# Patient Record
Sex: FEMALE | Race: WHITE
[De-identification: names, ages, dates, MRNs, and addresses within clinical notes are randomized per-mention and may not be internally consistent; named-entity substitution may affect disease eponyms.]

---

## 2017-03-12 ENCOUNTER — HOSPITAL ENCOUNTER (OUTPATIENT)
Age: 67
End: 2017-03-12
Payer: MEDICARE

## 2017-03-12 ENCOUNTER — HOSPITAL ENCOUNTER (EMERGENCY)
Dept: HOSPITAL 76 - ED | Age: 67
Discharge: ANOTHER HOSPITAL | End: 2017-03-12
Payer: MEDICARE

## 2017-03-12 ENCOUNTER — HOSPITAL ENCOUNTER (INPATIENT)
Dept: HOSPITAL 21 - PCC | Age: 67
LOS: 2 days | Discharge: HOME | DRG: 310 | End: 2017-03-14
Attending: FAMILY MEDICINE | Admitting: FAMILY MEDICINE
Payer: MEDICARE

## 2017-03-12 ENCOUNTER — HOSPITAL ENCOUNTER (OUTPATIENT)
Age: 67
Discharge: ANOTHER HOSPITAL | End: 2017-03-12
Payer: MEDICARE

## 2017-03-12 VITALS — HEART RATE: 69 BPM

## 2017-03-12 VITALS — BODY MASS INDEX: 29.94 KG/M2 | HEIGHT: 66 IN | WEIGHT: 186.29 LBS

## 2017-03-12 VITALS
SYSTOLIC BLOOD PRESSURE: 143 MMHG | HEART RATE: 71 BPM | DIASTOLIC BLOOD PRESSURE: 80 MMHG | OXYGEN SATURATION: 99 % | RESPIRATION RATE: 16 BRPM

## 2017-03-12 VITALS
OXYGEN SATURATION: 99 % | HEART RATE: 64 BPM | DIASTOLIC BLOOD PRESSURE: 67 MMHG | SYSTOLIC BLOOD PRESSURE: 122 MMHG | RESPIRATION RATE: 15 BRPM

## 2017-03-12 VITALS — SYSTOLIC BLOOD PRESSURE: 123 MMHG | DIASTOLIC BLOOD PRESSURE: 69 MMHG

## 2017-03-12 DIAGNOSIS — I47.1: Primary | ICD-10-CM

## 2017-03-12 DIAGNOSIS — R91.8: ICD-10-CM

## 2017-03-12 DIAGNOSIS — I47.2: Primary | ICD-10-CM

## 2017-03-12 DIAGNOSIS — I49.8: Primary | ICD-10-CM

## 2017-03-12 DIAGNOSIS — Z79.82: ICD-10-CM

## 2017-03-12 DIAGNOSIS — I47.2: ICD-10-CM

## 2017-03-12 DIAGNOSIS — R00.2: ICD-10-CM

## 2017-03-12 DIAGNOSIS — E78.5: ICD-10-CM

## 2017-03-12 DIAGNOSIS — I49.1: ICD-10-CM

## 2017-03-12 DIAGNOSIS — R00.2: Primary | ICD-10-CM

## 2017-03-12 DIAGNOSIS — I10: ICD-10-CM

## 2017-03-12 LAB
ALBUMIN/GLOBULIN RATIO: 1.6 (ref 1–2.2)
ANION GAP: 5 (ref 6–13)
APPEARANCE UR: CLEAR
BASOPHILS # (AUTO): 0 10^3/UL (ref 0–0.1)
BILIRUBIN,TOTAL: 1.6 MG/DL (ref 0.2–1)
BILIRUBIN,URINE: NEGATIVE
BUN - BLOOD UREA NITROGEN: 21 MG/DL (ref 6–20)
CALCIUM: 10 MG/DL (ref 8.5–10.3)
CARBON DIOXIDE - CO2: 27 MMOL/L (ref 21–32)
CHLORIDE: 106 MMOL/L (ref 101–111)
COLOR,URINE: (no result)
CREATININE: 0.8 MG/DL (ref 0.4–1)
EOSINOPHILS # (AUTO): 0.1 10^3/UL (ref 0–0.7)
GFR - MDRD: 72 (ref 89–?)
GLOBULIN: 2.6 G/DL (ref 2.1–4.2)
GLUCOSE: 105 MG/DL (ref 70–100)
HBV SURFACE AG SERPL QL CFM: 0.7 %
HBV SURFACE AG SERPL QL CFM: 1.4 %
HCT - HEMATOCRIT: 43 % (ref 37–47)
HCV AB SER QL: 8.8 FL (ref 7.9–10.8)
HGB - HEMOGLOBIN: 14.6 G/DL (ref 12–16)
LIPASE: 29 U/L (ref 22–51)
LYMPHOCYTES # (AUTO): 2 10^3/UL (ref 1.5–3.5)
LYMPHOCYTES % (AUTO): 33.2 %
MAGNESIUM: 2.5 MG/DL (ref 1.6–2.6)
MCH RBC QN AUTO: 29.7 PG (ref 27–35)
MEAN CORPUSCULAR HEMOGLOBIN: 30.1 PG (ref 27–31)
MEAN CORPUSCULAR HGB CONC: 34 G/DL (ref 32–36)
MEAN CORPUSCULAR VOLUME: 87.8 FL (ref 81–100)
MEAN CORPUSCULAR VOLUME: 88.6 FL (ref 81–99)
MONOCYTES # (AUTO): 0.4 10^3/UL (ref 0–1)
MONOCYTES % (AUTO): 6.7 %
NEUTROPHILS # (AUTO): 3.6 10^3/UL (ref 1.5–6.6)
NEUTROPHILS % (AUTO): 58 %
NUCLEATED RED BLOOD CELLS AUTO: 0 /100WBC
PELGER HUET CELLS BLD QL SMEAR: (no result)
PH,URINE: 6 (ref 5–8)
PH,URINE: 6 PH (ref 5–7.5)
PLATELET COUNT: 260 BIL/L (ref 150–400)
POTASSIUM: 3.8 MMOL/L (ref 3.5–5)
RBC UR QL: (no result)
RED BLOOD COUNT: 4.85 10^6/UL (ref 4.2–5.4)
RED CELL DISTRIBUTION WIDTH: 13.5 % (ref 12–15)
SODIUM: 138 MMOL/L (ref 135–145)
SPECIFIC GRAVITY,URINE: 1.01 (ref 1–1.03)
SPECIFIC GRAVITY,URINE: <=1.005 (ref 1–1.03)
TOTAL PROTEIN: 6.8 G/DL (ref 6.7–8.2)
TROPONIN T SERPL-MCNC: 0.01 UG/L (ref 0–0.01)
UA CHARGE (STRIP ONLY): YES
UA W/ MICROSCOPIC CHARGE: (no result)
UNCORRECTED WHITE BLOOD COUNT: 6.1 X10^3/UL
UR CULTURE IF IND: (no result)
UROBILINOGEN UR-MCNC: NORMAL MG/DL
WHITE BLOOD COUNT: 6.1 X10^3/UL (ref 4.8–10.8)
YEAST,URINE: (no result)

## 2017-03-12 PROCEDURE — 80053 COMPREHEN METABOLIC PANEL: CPT

## 2017-03-12 PROCEDURE — 81003 URINALYSIS AUTO W/O SCOPE: CPT

## 2017-03-12 PROCEDURE — 84443 ASSAY THYROID STIM HORMONE: CPT

## 2017-03-12 PROCEDURE — 93010 ELECTROCARDIOGRAM REPORT: CPT

## 2017-03-12 PROCEDURE — 87086 URINE CULTURE/COLONY COUNT: CPT

## 2017-03-12 PROCEDURE — 96374 THER/PROPH/DIAG INJ IV PUSH: CPT

## 2017-03-12 PROCEDURE — 99284 EMERGENCY DEPT VISIT MOD MDM: CPT

## 2017-03-12 PROCEDURE — 85025 COMPLETE CBC W/AUTO DIFF WBC: CPT

## 2017-03-12 PROCEDURE — 83690 ASSAY OF LIPASE: CPT

## 2017-03-12 PROCEDURE — 71020: CPT

## 2017-03-12 PROCEDURE — 81001 URINALYSIS AUTO W/SCOPE: CPT

## 2017-03-12 PROCEDURE — 84484 ASSAY OF TROPONIN QUANT: CPT

## 2017-03-12 PROCEDURE — 93005 ELECTROCARDIOGRAM TRACING: CPT

## 2017-03-12 PROCEDURE — 36415 COLL VENOUS BLD VENIPUNCTURE: CPT

## 2017-03-12 NOTE — XRAY PRELIMINARY REPORT
Accession: [id number]

Exam: XR Chest 2 View PA/LAT

 

IMPRESSION: No evidence of active cardiopulmonary process.

 

[facility]

 

SITE ID: [id number]

## 2017-03-12 NOTE — XRAY REPORT
EXAM:

CHEST RADIOGRAPHY

 

EXAM DATE: 3/12/2017 02:21 PM.

 

CLINICAL HISTORY: Palpitations.

 

COMPARISON: 12/13/2015.

 

TECHNIQUE: 2 views.

 

FINDINGS: 

Lungs/Pleura: No focal opacities evident. No pleural effusion. No pneumothorax. Normal volumes.

 

Mediastinum: Heart and mediastinal contours are unremarkable.

 

Other: None.

 

IMPRESSION: No evidence of active cardiopulmonary process.

 

[facility]

Referring Provider Line: [phone]

 

SITE ID: [id number]

## 2017-03-12 NOTE — ED PHYSICIAN DOCUMENTATION
History of Present Illness





- Stated complaint


Stated Complaint: RAPID HEART RATE





- Chief complaint


Chief Complaint: Cardiac





- History obtained from


History obtained from: Patient





- History of Present Illness


Timing: Last night





- Additonal information


Additional information: 





65 y/o female with heart palpitations since last night has called the ambulance 

and was evaluated in her home. She reports brief episodes of "irregular heart 

rate" and "rapid heart rate" She was not able to clock a rate. When medics 

arrived their initial rhythm strip showed a dysorganized rhythm with what looks 

like PAC's. The medic indicated that they did see about a 6 beat run of V-tach 

but were unable to capture this on the strip. They were able to capture some 

couplets. The patient 





Review of Systems


Constitutional: denies: Fever, Chills, Myalgias


Eyes: denies: Decreased vision


Ears: denies: Ear pain


Nose: denies: Rhinorrhea / runny nose, Congestion


Throat: denies: Sore throat


Cardiac: reports: Palpitations.  denies: Chest pain / pressure, Pedal edema, 

Calf pain


Respiratory: denies: Dyspnea, Cough


GI: denies: Abdominal Pain, Nausea, Vomiting


: denies: Dysuria, Frequency





PD PAST MEDICAL HISTORY





- Past Medical History


Past Medical History: Yes


Cardiovascular: Hypertension, High cholesterol


Respiratory: Asthma, Pneumonia


HEENT: Glaucoma





- Past Surgical History


Past Surgical History: Yes


/GYN: Hysterectomy


HEENT: Tonsil/Adenoidectomy





- Present Medications


Home Medications: 


 Ambulatory Orders











 Medication  Instructions  Recorded  Confirmed


 


Amlodipine/Valsartan [Exforge 1 each PO DAILY 10/29/15 03/12/17





5-160 mg Tablet]   


 


Estradiol [Estrace] 0.5 mg PO DAILY 10/29/15 03/12/17


 


Aspirin 1 tab DAILY PRN 12/13/15 03/12/17


 


diazePAM [Valium] 5 mg PO TID PRN #15 tablet 12/13/15 03/12/17














- Allergies


Allergies/Adverse Reactions: 


 Allergies











Allergy/AdvReac Type Severity Reaction Status Date / Time


 


No Known Drug Allergies Allergy   Verified 10/29/15 21:02














- Social History


Does the pt smoke?: No


Smoking Status: Never smoker


Does the pt drink ETOH?: Yes


Does the pt have substance abuse?: No





- Immunizations


Immunizations are current?: No


Immunizations: TDAP >10years/unknown





PD ED PE NORMAL





- Vitals


Vital signs reviewed: Yes (tachy and hypertensive )





- General


General: Alert and oriented X 3, No acute distress, Well developed/nourished





- HEENT


HEENT: Atraumatic, PERRL, EOMI





- Neck


Neck: Supple, no meningeal sign, No bony TTP





- Cardiac


Cardiac: RRR, No murmur





- Respiratory


Respiratory: No respiratory distress, Clear bilaterally





- Abdomen


Abdomen: Soft, Non tender





- Back


Back: No CVA TTP, No spinal TTP





- Derm


Derm: Normal color, No rash





Results





- Vitals


Vitals: 


 Vital Signs - 24 hr











  03/12/17 03/12/17 03/12/17





  13:46 16:04 17:45


 


Temperature 36.4 C L  36.6 C


 


Heart Rate 107 H 101 H 82


 


Respiratory 18  16





Rate   


 


Blood Pressure 139/89 H 127/96 H 123/69


 


O2 Saturation 99 99 99








 Oxygen











O2 Source                      Room air

















- EKG (time done)


  ** 1341


Rate: Rate (enter#) (73)


Rhythm: NSR


Ischemia: ST depression (inferior )


Other comments: Other comments (atrial premature complex)


Compare to prior EKG: Changed from prior EKG (SPT 12-13-15 isolated t-wave 

inversion in III has occured and PAC's have occured)


Computer interpretation: Agree with computer





- Tele (time rhythm occurred)


  ** 1407


Telemetry / rhythm strip: V tach





- Labs


Labs: 


 Laboratory Tests











  03/12/17 03/12/17 03/12/17





  13:40 13:45 13:45


 


WBC   6.1 


 


RBC   4.85 


 


Hgb   14.6 


 


Hct   43.0 


 


MCV   88.6 


 


MCH   30.1 


 


MCHC   34.0 


 


RDW   13.5 


 


Plt Count   257 


 


MPV   8.8 


 


Neut #   3.6 


 


Lymph #   2.0 


 


Mono #   0.4 


 


Eos #   0.1 


 


Baso #   0.0 


 


Absolute Nucleated RBC   0.00 


 


Nucleated RBCs   0.0 


 


Sodium    138


 


Potassium    3.8


 


Chloride    106


 


Carbon Dioxide    27


 


Anion Gap    5.0 L


 


BUN    21 H


 


Creatinine    0.8


 


Estimated GFR (MDRD)    72 L


 


Glucose    105 H


 


Calcium    10.0


 


Total Bilirubin    1.6 H


 


AST    17


 


ALT    21


 


Alkaline Phosphatase    63


 


Troponin I   


 


Total Protein    6.8


 


Albumin    4.2


 


Globulin    2.6


 


Albumin/Globulin Ratio    1.6


 


Lipase    29


 


TSH  1.22  


 


Urine Color   


 


Urine Clarity   


 


Urine pH   


 


Ur Specific Gravity   


 


Urine Protein   


 


Urine Glucose (UA)   


 


Urine Ketones   


 


Urine Occult Blood   


 


Urine Nitrite   


 


Urine Bilirubin   


 


Urine Urobilinogen   


 


Ur Leukocyte Esterase   


 


Ur Microscopic Review   


 


Urine Culture Comments   














  03/12/17 03/12/17





  13:45 15:34


 


WBC  


 


RBC  


 


Hgb  


 


Hct  


 


MCV  


 


MCH  


 


MCHC  


 


RDW  


 


Plt Count  


 


MPV  


 


Neut #  


 


Lymph #  


 


Mono #  


 


Eos #  


 


Baso #  


 


Absolute Nucleated RBC  


 


Nucleated RBCs  


 


Sodium  


 


Potassium  


 


Chloride  


 


Carbon Dioxide  


 


Anion Gap  


 


BUN  


 


Creatinine  


 


Estimated GFR (MDRD)  


 


Glucose  


 


Calcium  


 


Total Bilirubin  


 


AST  


 


ALT  


 


Alkaline Phosphatase  


 


Troponin I  < 0.04 


 


Total Protein  


 


Albumin  


 


Globulin  


 


Albumin/Globulin Ratio  


 


Lipase  


 


TSH  


 


Urine Color   YELLOW


 


Urine Clarity   CLEAR


 


Urine pH   6.0


 


Ur Specific Gravity   <=1.005


 


Urine Protein   NEGATIVE


 


Urine Glucose (UA)   NEGATIVE


 


Urine Ketones   NEGATIVE


 


Urine Occult Blood   NEGATIVE


 


Urine Nitrite   NEGATIVE


 


Urine Bilirubin   NEGATIVE


 


Urine Urobilinogen   0.2 (NORMAL)


 


Ur Leukocyte Esterase   NEGATIVE


 


Ur Microscopic Review   NOT INDICATED


 


Urine Culture Comments   NOT INDICATED














- Rads (name of study)


  ** 2 view chest


Radiology: Prelim report reviewed (Impression: No evidence of active 

cardiopulmonary process.), EMP read indepedently, See rad report





PD MEDICAL DECISION MAKING





- ED course


Complexity details: reviewed old records, reviewed results, re-evaluated patient

, considered differential, d/w patient, d/w family, d/w consultant (Caroline:  

faxed strips for review. After review of strips and EKG he recommends admission 

here, administration of metoprolol 50gm BID, Echo in am and stress testing with 

transfer if +. Dr. Moreland is consulted in the case and recommends transfer. )


ED course: 





65 y/o female with symptomatic arrhythmia is found to have intermittent v-tach. 

She is administered magnesium 2gm IV and she is given metoprolol succinate 50mg 

PO. Arrangements are made for transfer to [facility] with Dr. Benitez accepting . 

After administration of metoprolol her arrhythmia subsided as did her symptoms. 





Departure





- Departure


Disposition: 02 Transfer [facility]


Clinical Impression: 


 Ventricular tachycardia (paroxysmal)


Discharge Date/Time: 03/12/17 17:50

## 2017-03-13 VITALS
RESPIRATION RATE: 23 BRPM | OXYGEN SATURATION: 98 % | SYSTOLIC BLOOD PRESSURE: 132 MMHG | HEART RATE: 75 BPM | DIASTOLIC BLOOD PRESSURE: 74 MMHG

## 2017-03-13 VITALS
HEART RATE: 58 BPM | RESPIRATION RATE: 16 BRPM | DIASTOLIC BLOOD PRESSURE: 75 MMHG | OXYGEN SATURATION: 97 % | SYSTOLIC BLOOD PRESSURE: 114 MMHG

## 2017-03-13 VITALS
RESPIRATION RATE: 18 BRPM | DIASTOLIC BLOOD PRESSURE: 80 MMHG | OXYGEN SATURATION: 95 % | SYSTOLIC BLOOD PRESSURE: 132 MMHG | HEART RATE: 61 BPM

## 2017-03-13 VITALS
RESPIRATION RATE: 16 BRPM | OXYGEN SATURATION: 95 % | DIASTOLIC BLOOD PRESSURE: 68 MMHG | SYSTOLIC BLOOD PRESSURE: 111 MMHG | HEART RATE: 73 BPM

## 2017-03-13 VITALS — HEART RATE: 74 BPM

## 2017-03-13 VITALS
OXYGEN SATURATION: 96 % | RESPIRATION RATE: 16 BRPM | DIASTOLIC BLOOD PRESSURE: 64 MMHG | SYSTOLIC BLOOD PRESSURE: 101 MMHG | HEART RATE: 64 BPM

## 2017-03-13 VITALS
OXYGEN SATURATION: 96 % | DIASTOLIC BLOOD PRESSURE: 80 MMHG | HEART RATE: 66 BPM | SYSTOLIC BLOOD PRESSURE: 125 MMHG | RESPIRATION RATE: 20 BRPM

## 2017-03-13 VITALS — HEART RATE: 58 BPM

## 2017-03-13 RX ADMIN — TIMOLOL MALEATE SCH DROP: 5 SOLUTION/ DROPS OPHTHALMIC at 07:51

## 2017-03-13 NOTE — NUR
CT scan



Patient off floor to CT scan accompanied by nurse and CNA. Tele continued sinus rhythm with 
PVCs and SVPBs, no runs of v-tach. Returned to floor at 0350. Continue to monitor.

## 2017-03-13 NOTE — DRSVH
[facility]

                                                          [address] [place] [address]

                                                         Phone [phone]

 

 

 

                            Echocardiogram Report

Name: AMARI GUZMAN                             Study Date: 2017      

Height: 66 in

Hospital MRN #: [id number]                       Exam Location: [facility]          

Weight: 187 lb

Account #: [id number]                           Gender: Female

                                                                             

BSA: 1.9 m2

: 1950                                  Age: 66 yrs                 

BP: 132/74 mmHg

Reason For Study: V-TACH

Ordering Physician: HOSPITALIST [facility]              Performed By: Sarah Garrido

Referring Physician: DR. CALVIN JONES, DR. HARVEY

 

Interpretation Summary

1. Normal left ventricular size, normal wall thickness and systolic function

with an estimated EF of 60-65%

2. Normal right ventricular size and systolic function.

3. No valvular pathology appreciated

 

There is no old study for comparison

 

Procedure:   A two-dimensional transthoracic echocardiogram with color flow

and Doppler was performed. The study quality was technically adequate. A

contrast injection of Definity was performed to improve assessment of LV

function. A total of 3 cc of contrast was given. There is no prior

echocardiogram noted for this patient. The patient was in normal sinus rhythm

during the exam.

Left Ventricle:   The left ventricle is normal in size. There is normal left

ventricular wall thickness. The ejection fraction is estimated to be 60-65%.

No obvious focal wall motion abnormalities appreciated. Assessment of

diastolic parameters indicates normal left ventricular diastolic function and

normal filling pressures.

Right Ventricle:   The right ventricle is normal in size and function.

Atria:   Both atria are normal in size. There is no Doppler evidence for an

interatrial shunt.

Mitral Valve:   The mitral valve leaflets appear normal. There is no evidence

of stenosis, fluttering, or prolapse. There is trace mitral regurgitation.

Aortic Valve:   The aortic valve is trileaflet. The aortic valve opens well.

No aortic regurgitation is present.

Tricuspid Valve:   The tricuspid valve leaflets are thin and pliable. There

is a trace or physiologic amount of tricuspid regurgitation. The right

ventricular systolic pressure is estimated at 28 mmHg assuming a right atrial

pressure of 3 mm Hg.

Pulmonic Valve:   The pulmonic valve is not well visualized. There is a trace

or physiologic amount of pulmonic regurgitation.

Great Vessels:   The aortic root is normal size. The dimensions of the

ascending aorta are normal. The aortic arch is normal in size. The pulmonary

artery is normal size. The IVC is of normal diameter and collapses greater

than 50% with a sniff. This suggests a low right atrial pressure of 3 mm Hg.

Pericardium/ Pleura   There is no pericardial effusion. There is no pleural

effusion.

 

MMode/2D Measurements & Calculations

LVIDd: 4.5 cm         LA dimension: 3.8 cm  RA long axis  LVOT diam: 1.9 cm

LVIDs: 2.8 cm                                             AoV Opening

FS: 38.6 %            LA A2 area: 16.7 cm   RA area

IVSd: 1.1 cm          LA A4 area: 23.0 cm                 Ao root diam

LVPWd: 0.62 cm        LA length (vol)       : 18.0 cm

                                            RA vol        Aortic Jxn: 2.9 cm

                      LA vol: 60.8 ml       : 53.0 ml     asc Aorta Diam

                      LA vol index          RA VI

                                            : 27.3 mm/    Ao Arch Diam (Prox

                                            RVDd major    Trans): 2.5 cm

                      IVC diam: 2.0 cm      : 5.6 cm

 

       _______________________________________________________________

LV lucero. diameter/BSA LV sys. diameter/BSA  RVD1 (basal)

(cm/m^2): 2.3         (cm/m^2): 1.4

 

Doppler Measurements & Calculations

Ao V2 max        MV E max wayne         MV E/A: 1.1           TR max wayne

: 111.1 cm/sec   : 70.9 cm/sec        Med Peak E' Wayne       : 247.5 cm/sec

Ao max PG        MV A max wayne                               TR max PG

: 4.9 mmHg       : 66.4 cm/sec        E/E' med: 12.3        : 24.5 mmHg

Ao mean PG       MV P1/2t: 56.9 msec  Lat Peak E' Wayne       PA V2 max

                                                            : 60.8 cm/sec

LVOT Max Wayne                          E/E' lat: 6.5         PA mean PG

: 70.8 cm/sec                         E/e' average: 9.4     : 0.80 mmHg

PRESTON(I,D): 2.0 cm                      Pulm A Revs Dur       PA Accel Time

AS sev ratio                                                : 0.14 sec

                                      MV A dur: 0.09 sec

 

       _______________________________________________________________

MV dec time      MV P1/2t max wayne     Ao V2 mean            LV V1 max PG

: 0.19 sec                            : 81.1 cm/sec

                                      Ao V2 VTI: 23.6 cm    LV V1 VTI

                 MVA(P1/2t): 3.9 cm2                        : 16.1 cm

                                      PRESTON(V,D): 1.9 cm2

       _______________________________________________________________

 

PA V2 mean       PRESTON indexed to BSA   Pulm A Revs Dur - MV

: 42.6 cm/sec    (cm^2/m^2): 1.0      A Dur: 0.04 msec

 

______________________________________________________________________________

 

                  Electronically signed by: Sharri Riddle on 2017

Reading Physician:06:15 PM

## 2017-03-13 NOTE — DRSVH
PROCEDURE:  X-RAY CHEST ONE VIEW, PORTABLE ([id number])

 

INDICATIONS:  ARRYTHMIA

 

TECHNIQUE:  One view of the chest was acquired.  

 

COMPARISON:  [facility], RG, XR CXR 1V, 12/13/2015, 10:59.

 

FINDINGS:  

 

Surgical changes and devices:  None.  

 

Lungs and pleura:  No pleural effusions or pneumothorax.  Lungs are clear.  

 

Mediastinum:  Mediastinal contours appear normal.  Heart size is normal.  

 

Bones and chest wall:  No suspicious bony lesions.  Overlying soft tissues appear unremarkable.  

 

IMPRESSION:  No acute cardiopulmonary disease.  

 

 

 

Dictated by:  Mendel VILLALOBOS Interpreted: Fany Zuniga MD on 3/13/2017 at 10:09   

Transcribed by:  SHYANNE on 3/13/2017 at 10:10    

Approved by:  Fany Zuniga M.D. on 3/13/2017 at 16:47

## 2017-03-13 NOTE — CONS
[address] [address]

 

                              CONSULTATION REPORT

 

PATIENT:  AMARI GUZMAN                                       : 1950

ACCOUNT#: [id number]                                            MR#: [id number]

ADMIT:    2017

JOB ID:   [id number]

 

 

DATE OF SERVICE:

2017

 

IDENTIFICATION:

Dr. Kike Paul has asked that I consult on this 66-year-old female admitted in

transfer from [facility] because of nonsustained

tachyarrhythmias.

 

HISTORY:

The patient denies any documented previous cardiac history, although over the

last year has noted intermittent brief episodes of palpitations that she

describes as a brief fluttering sensation just lasting a few seconds occurring

rather sporadically around once a week without any obvious trigger or

association with any other issue.  Over the previous two days, she has noted a

significant increase in the frequency and duration of these episodes, stating

it felt like it was "constant" but without any sense of any lightheadedness or

presyncope.  There was no chest discomfort or dyspnea.  However, these became

quite disconcerting and she presented to [facility] where she was

found to have frequent wide QRS beats as well as an 11 beat run of a wide

complex tachycardia.  She was treated with metoprolol and magnesium sulfate

with a fairly prompt improvement in her symptoms, although she continued to

have occasional PVCs.  She was subsequently transferred to [facility] where a D-dimer was mildly elevated, but his CT angiogram was negative

for any pulmonary embolism.  Here, she has continued to have frequent ectopy

with beats up to 11 beats, but always being initiated with a narrow complex

premature beat.  She has not noticed these nearly as much as prior to

admission.  She currently feels well and denies any other symptoms and

specifically denies any dyspnea or chest discomfort.  She walks semiregularly,

as recently as two weeks ago for around 20 minutes without any anginal-type

discomfort, dyspnea, or change in her exercise capacity.  She typically drinks

three cups of coffee but has not found any association with the frequency of

her palpitations to her caffeine consumption.

 

CARDIAC RISK FACTORS:

Notable for hypertension and hyperlipidemia.  She denies any history of

diabetes or tobacco use.  Family history notable for a father who had a stroke

at age 76 but no cardiac disease and specifically there is no family history of

any sudden cardiac death.

 

PAST MEDICAL HISTORY:

Notable for tonsillectomy, hysterectomy, and parathyroid adenoma resection.

She has had a previous melanoma excision and has had glaucoma as well as

hormonal replacement therapy.  She has chronic interstitial cystitis, and has

had previous vertigo.  She reports a history of mild seasonal asthma.

 

HOME MEDICATIONS:

1.     Amlodipine/valsartan 5/160 mg daily.

2.     Atorvastatin 10 mg daily.

3.     Magnesium amino acid 800 mg daily.

4.     Lumigan 1 drop both eyes daily.

5.     Timolol one drop in each eye daily.

6.     Estradiol 0.5 mg daily.

7.     Multivitamin with folate daily.

 

FAMILY HISTORY:

As above.

 

SOCIAL HISTORY:

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  She denies any alcohol consumption.

 

REVIEW OF SYSTEMS:

A complete review is performed and is notable for the absence of any fevers or

chills or unexplained weight change.  Denies any vision change or ENT problems.

Denies any dyspnea.  She has an occasional nonproductive cough that she

attributes to her asthma, but this has not changed and she denies any

hemoptysis.  No history of any GI blood loss or peptic ulcer disease.  Denies

any genitourinary complaints or hematuria.  She is postmenopausal around eight

years ago.  She denies any unusual musculoskeletal complaints or neurologic

symptoms.  No history of any thyroid or bleeding disorder.  Denies any anxiety,

depression, or pedal edema.

 

PHYSICAL EXAMINATION:

Pleasant, middle-aged, white female, in no distress.  HR 71, /75, O2

saturation 98% on room air.  Weight is 84.9 kg.  Skin:  Warm and dry.  HEENT:

EOMI without arcus or xanthelasma.  She has fairly good dentition.  Lungs:

Clear bilaterally to auscultation and percussion without any appreciable rales

or wheeze.  CV:  Nonpalpable PMI with a regular rate and rhythm although with

frequent premature beats, often consecutively.  Normal S1 and S2 without any

appreciable murmurs or gallops.  There is no obvious JVD.  Carotid and femoral

pulses are all 1+ with a normal upstroke and no bruit.  Dorsalis pedis is

nonpalpable on the right and 2+ on the left while posterior tibial pulses are

nonpalpable.  Abdomen:  Soft, nondistended, nontender without any palpable

masses or organomegaly.  Normal bowel tones are present without any bruits.

Extremities warm without any clubbing, cyanosis, or edema.  Neuro:  Moves all

four extremities.  Psych:  Awake, alert, and oriented.

 

LABORATORY:

Potassium this morning is 3.9 with a BUN of 12 and a creatinine of 0.6.

Magnesium was 2.5 yesterday.  LFTs are normal.  Troponin is normal.  Hematocrit

is 43%.  Her D-dimer is elevated at 1.0.  A TSH at [facility] was 1.2.

 

Chest x-ray:  Showed no infiltrates or evidence of acute disease.

 

ECG:  Shows sinus rhythm, initially with PACs, at times in a bigeminal pattern

with occasional wide QRS beats, possibly reflecting PVCs but more likely

aberrant conduction.  There is some nonspecific repolarization in the inferior

leads.  Subsequent ECG shows resolution of her ectopy.  Telemetry here shows

sinus rhythm with 5-6 beat runs with a wide QRS complex after initiating with a

PAC.

 

IMPRESSION:

1.     Palpitations with brief nonsustained tachyarrhythmias.  I will review

with Dr. Cespedes, but my suspicion is that these reflect premature atrial

contractions and brief supraventricular tachycardia given the initiation

with a PAC with subsequent aberrant conduction.  She is currently

asymptomatic.  There is no obvious trigger, but I did recommend that she

reduce her caffeine consumption.  I would recommend obtaining an

echocardiogram to insure the absence of any underlying structural heart

disease.  As long as this is normal, then I would proceed with an exercise

treadmill myocardial perfusion study off of beta blockade in an effort to

see if this precipitates any more significant ectopy.  As long as these two

studies are benign, then her risk of lethal arrhythmias is extremely low,

and I suspect that she can be discharged home with escalating doses of beta

blockade as needed to help suppress her arrhythmias.  I would also strive to

keep her potassium greater than 4 and magnesium greater than 2.  If she is

intolerant of metoprolol, low-dose diltiazem could also be considered and

titrated up, particularly if she develops any asthma-like symptoms with the

metoprolol.

2.     Hypertension.  She may be able to get off of some of her other

antihypertensives as her metoprolol is added.

3.     Hyperlipidemia.  No data.  I would strive to keep her LDL less than

100, but I will defer this to her primary care physicians.

 

PLAN:

1.     Obtain an echocardiogram followed by an exercise treadmill myocardial

perfusion imaging study.  As long as these are benign, then I think that she

can be discharged on metoprolol therapy with followup as needed for any

symptomatic recurrence of her palpitations.

2.     Maintain potassium and magnesium greater than 4.0 and 2.0, respectively.

3.     Adjust her antihypertensives to avoid hypotension with the addition

of her metoprolol.

 

I spent from 10:25 to 11:44 reviewing the patient's record, interviewing and

examining the patient and documenting this and answering her and her 's

questions.

## 2017-03-13 NOTE — DRSVH
PROCEDURE:  CT ANGIO CHEST PULMONARY EMBOLISM ([id number])

 

INDICATIONS:  Arrythmia/possible PE

 

TECHNIQUE:  

After the administration of intravenous contrast, 2 mm thick sections acquired from the pulmonary api
sam to the posterior costophrenic angles.  3-dimensional maximum intensity projection (MIP) coronal a
nd sagittal reformats were then acquired through the thorax.  For radiation dose reduction, the follo
wing was used:  automated exposure control, adjustment of mA and/or kV according to patient size.  

 

COMPARISON:  [facility], CR, L-SPINE 2-3 VIEWS, 1/23/2017, 15:54.  [facility], CT, PELVIS W
ITHOUT CONTRAST, 1/23/2017, 16:42.  [facility], CT, THORAX WITH CONTRAST, 1/10/2017, 12:59.  [facility], RG, XR CXR 1V, 12/13/2015, 10:59.

 

FINDINGS:  

Image quality:  Excellent.  

 

Pulmonary arteries:  Pulmonary arteries are normal in size, and demonstrate no intraluminal filling d
efects to suggest central pulmonary embolism.  

 

Lungs and pleura:  Multiple lung nodules are identified. There is a 3 mmnodule in the right major fis
sure (series 6 image 75).  A couple of 2 mm subpleural right upper lobe nodules are identified (serie
s 6 image 65). There is a 3 mm nodule is seen in the lingula (series 6 image 78). Lungs are otherwise
 clear.  No pleural effusions or pneumothorax.  Central and peripheral airways are patent.  

 

Mediastinum:  Heart size is normal, without pericardial effusion.  No mediastinal or hilar adenopathy
.  Thoracic aorta is normal in caliber and enhancement.  Esophagus is normal in caliber, without hiat
al hernia.  

 

Bones and chest wall: There is a minimally displaced left L1 transverse process fracture, which is ne
w since 1/23/3017.  Ribs and thoracic spine appear intact throughout.  Thyroid gland is normal.  No a
xillary or supraclavicular adenopathy.  

 

Abdomen:  There is a 1.3 cm low-density nodule in the posterior segment right hepatic lobe, unchanged
. Probable 8 mm right adrenal adenoma. 

 

IMPRESSION:

 

1. No evidence for central pulmonary embolus.

 

2. Multiple small lung nodules bilaterally as described. Recommend followup based on risk profile (se
e recommendation).

 

3. Minimally displaced left L1 transverse process fracture.

 

 

Fleischner Society criteria for SOLID lung nodule followup.  

Nodule size (mm)Low-risk patientHigh-risk [id number] follow-up neededFollow-up at 12 mo; if no borja
e, no further follow-up>5-7Pxbeef-sk CT at 12 mo; if no change, no further follow-up needed.Initial f
ollow-up CT at 6-12 mo, then 18-24 mo if no change.  >6-8Initial follow-up CT at 6-12 mo, then 18-24 
mo if no change. Initial follow-up CT at 3-6 mo, then 9-12 mo and 24 mo if no change.  >8Follow-up CT
 at 3, 9, 24 mo.  Or PET and/or biopsy.Same as for low-risk pts.  

Fleischner Society criteria for SUB-SOLID lung nodule followup.  

Solitary pure ground-glass nodules5 mm or lessNo followup needed.  >5 mm3 mo follow-up CT to confirm 
persistence.  Then annual CT for 3 years.  Part-solid nodules3 mo follow-up CT to confirm persistence
.  If persistent with solid component <5 mm, annual CT for at least 3 years.  If solid component is 5
 mm or more, biopsy or surgical resection.  Consider PET-CT for lesions > 10 mm.  Multiple sub-solid 
nodulesPure ground glass nodules 5 mm or lessFollowup CT at 2 and 4 years.  Pure ground glass nodules
 >5 mm without dominant lesion.  3 month followup CT to confirm persistence, then annual followup CT 
for at least 3 years.  Dominant nodule(s) with part-solid or solid component.  3 month followup CT to
 confirm persistence.  If persistent, consider biopsy or surgical resection, dunia if lesions have >5 m
m solid component.  

 

 

 

 

Dictated by:  Anabel Tohmas M.D. on 3/13/2017 at 8:36     

Approved by:  Anabel Thomas M.D. on 3/13/2017 at 8:54

## 2017-03-13 NOTE — HP
[address] [address]

 

                              HISTORY AND PHYSICAL

 

PATIENT:  AMARI GUZMAN                                       : 1950

ACCOUNT#: [id number]                                            MR#: [id number]

ADMIT:    2017

JOB ID:   [id number]

 

 

HISTORY OF PRESENT ILLNESS:

This is a 66-year-old female presenting with symptomatic palpitations and

observed short bursts of ventricular tachycardia/paired PVCs.  She says that

she called the paramedics to her home at noon today because since 8 p.m. last

night she had been kept awake by palpitations.  There was no chest pain or

shortness of breath.  She just had this abnormal sensation.  She has had this

going on for about two years but it always goes away quickly and this time it

did not.  On evaluation of the emergency department report of [facility]

and on the reports from the medics, they did see a 6-beat run of ventricular

tachycardia, but were unable to capture it on a strip.  They said that their

1st rhythm strip showed a disorganized rhythm with what looked like PACs.  She 
is

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

irregularity.  Her rhythm here appears to be quite varied with sinus pauses of

up to not quite 2 seconds, PACs and PVCs, and a 3-4 beat run of multifocal PVCs

that I observed but also did not catch on the EKG because the tech helpfully did

not push the record button until the rhythm looked more normalized.  She had

not been placed on recorded telemetry as yet.  So she was evaluated thoroughly

at the emergency department  in [place], and Dr. Medina was contacted, who

advised that she undergo echocardiogram, treadmill EKG, and be started on

metoprolol.  Their hospital was unwilling to accept her for admission there

without a Cardiology backup, and so she was transferred here.  She was given

metoprolol 50 mg before leaving, and says that her palpitations resolved almost

completely with that treatment.  She is concerned that they will return and is

hopeful that she can receive more of that medicine.  She has no history of

heart disease and has no family history of heart disease.  She does not over-

indulge in caffeine, alcohol or any other illicit substances.  She is not on

any medications that would be expected to cause this.  Her only risk factors

are hypertension and hyperlipidemia.  She was also given 2 g of magnesium IV in

the emergency department, but no level had been done.

 

MEDICINES:

1.     Amlodipine/valsartan 5/160 daily.

2.     Atorvastatin 10 mg a day.

3.     Magnesium amino acid 800 mg a day.

4.     Lumigan, one drop in each eye daily.

5.     Timolol, one drop in each eye daily.

6.     Estradiol 0.5 mg a day.

7.     Multivitamin with folate once a day.

 

PAST MEDICAL HISTORY:

Hypertension, hyperlipidemia, tonsillectomy, hysterectomy, parathyroidectomy,

left arm melanoma excision, glaucoma, and hormone replacement therapy.

 

FAMILY HISTORY:

Father had a CVA.

 

ADDITIONAL PAST MEDICAL HISTORY:

No history of diabetes, thyroid condition, heart disease.

 

SOCIAL HISTORY:

Her son accompanies her today.  She lives in [place].  Does not smoke.  Does

not drink alcohol.  Does not use illicit drugs.  She is a retired OR nurse from

12 years ago.

 

REVIEW OF SYSTEMS:

Positive for palpitations.  Negative for chest pain, fevers, chills, sweats,

coughing, abdominal pain, nausea, vomiting, dysuria, bleeding, rashes,

seizures, joint pain, back pain, hearing loss, new allergies, sore throat.

 

PHYSICAL EXAM:

General:  Alert and oriented.  No apparent distress.  She is a careful and

thoughtful historian as would be expected from her career history.

 

LABORATORY DATA:

White count 6.6, hemoglobin 14.6, platelets 260.  Urinalysis is pending.

D-dimer is 1.0.  Sodium 140, potassium 3.8, chloride 104, CO2 is 20, BUN is 13,

creatinine 0.58, glucose 107, .  Liver enzymes are normal.  Magnesium is

2.5.  Troponin is less than 0.04 in [place].  Urinalysis in [place] was

completely normal.

 

EKG shows sinus rhythm with frequent PACs and Q-waves in lead V1 consistent

with possible old anteroseptal MI.  T-waves are inverted in lead III.

 

ASSESSMENT/PLAN:

1.     Ventricular tachyarrhythmia, symptomatic.  The patient will be

admitted and has been discussed with Dr. Medina, who suggests an

echocardiogram and, if that is reassuring, than proceeding with a treadmill

EKG.  She will be seen in consultation by Dr. Chu tomorrow.  Her thyroid

testing is normal.  Magnesium level was normal.  Her EKG has some

interesting findings, and will be repeated in the morning.  She is

definitely experiencing a variety of arrhythmias including the sinus pauses,

PACs and PVCs, along with the short bursts of ventricular tachycardia.  She

will be given metoprolol IV if needed.  We will be holding her amlodipine

and valsartan.

2.     Hypertension.  Using metoprolol and holding valsartan/amlodipine for

now.

3.     Hyperlipidemia.  Continue atorvastatin.

4.     Elevated D-dimer.  Check a CT angiogram to rule out pulmonary embolus

as the cause of the tachyarrhythmia and symptomatic palpitations experienced

for 24 hours now.

 

 

JUSTINO

## 2017-03-13 NOTE — NUR
Admission/Tele



Patient arrived via EMS from [facility] at 1905. A&Ox3, FIERRO, cooperative with care. 
Patient admitted for new onset dysrhythmia; palpitations x24 hours with 4-6 beat runs of 
v-tach seen on cardiac monitor. Denies chest pain, nausea, dizziness, shortness of breath. 
Metoprolol given at [facility] with no further runs of v-tach en route. Patient placed 
on telemetry and seen to be in sinus rhythm with PVCs and supraventricular premature beats. 
Runs of v-tach with increasing frequency through the evening, 4-7 beats. One run on PSVT 
lasting 30 seconds with 4 beats of v-tach in the middle. Patient reports "feeling weird" 
during later episodes of v-tach, although she continues to deny chest pain. IV push 
metoprolol 5 mg given at 2345; patient in sinus rhythm/sinus bradycardia with rates 54-62. 



D-dimer returned elevated; per Dr. Paul, CT angio to rule out PE. Continue close 
monitoring.

## 2017-03-13 NOTE — NUR
Pain/Sleep



Patient reporting 8/10 chest/abdominal pain early in the shift. Sublingual nitroglycerine 
given x1 with brief improvement in symptoms. Morphine 2mg IVP given x1 with significant 
improvement in symptoms. Patient asleep for remainder of shift. Rouses to voice, cooperative 
with care. States pain has improved to 1/10 and abdomen is no longer tender to touch. 
Continue to monitor.

## 2017-03-13 NOTE — PCM.PNMED
Subjective


Date of Service


Mar 13, 2017


Subjective


Overnight: Patient had an episode of chest/abdominal pain early in the evening. 

Sublingual nitroglycerine given x1 with brief improvement in symptoms. Morphine 

2mg IVP given x1 with significant improvement in symptoms. Patient asleep for 

remainder of shift. No other acute event. Tele showed sinus rhythm in the 60s 

with a few 5-6 beats runs of VT and one PSVT in the 140s. 





Today: Patient reports to feel well this morning with no symptoms. She denies 

any chest pain or shortness of breath. Her daughter, who is a paramedics, notes 

that the patient goes into VT while she is sleeping, but her rhythm seems to be 

normal while awake. She has good appetite and ambulates independently. Voiding 

with no issue.





Exam


Vital Signs





 Vital Sign - Last








  Date Time  Temp Pulse Resp B/P Pulse Ox O2 Delivery O2 Flow Rate FiO2


 


3/13/17 07:38 36.5 58 16 114/75 97 Room Air  














 Intake and Output    


 


 3/12/17 3/12/17 3/13/17 Cumulative From/Thru





 15:00 23:00 07:00 3/12/17 20:34 - 3/13/17 06:26


 


Intake Total   420 ml 420 ml


 


Output Total   675 ml 675 ml


 


Balance   -255 ml -255 ml


 


    


 


Intake Oral   375 ml 375 ml


 


IV Total   45 ml 45 ml


 


Output Urine Total   675 ml 675 ml








Exam


General: Well-developed, well-nourished, in no acute distress


HEENT: atraumatic, external ears normal, scleral anicteric, conjunctiva pink/

moist, oral mucosa moist.


Neck: supple, no thyromegaly, no lymphadenopathy, no JVD


Chest: clear to auscultation bilaterally, no wheezes/rales/rhonchi noted.


CV: regular rate and rhythm, no murmurs/gallops appreciated.


Abdomen: soft, nondistended, nontender, normoactive bowel sound throughout, no 

guarding, no organomegaly.


Extremities: no edema, clubbing, or cyanosis


Neuro: balanced, coordinated gait. Moving all extremities spontaneously. Normal 

motor strength. Sensation to light touch intact.


Skin: no rash, lesions, or ulcer noted.





IVs and Medications


Medications Reviewed:  Medications were reviewed in detail





Lab and Diagnostics


Result Diagram:  


3/12/17 2020                                                                   

             3/13/17 0500





X-Rays, CTs and MRIs


PROCEDURE:  CT ANGIO CHEST PULMONARY EMBOLISM 


 


FINDINGS:  


 


Pulmonary arteries:  Pulmonary arteries are normal in size, and demonstrate no 

intraluminal filling defects to suggest central pulmonary embolism.  


 


Lungs and pleura:  Multiple lung nodules are identified. There is a 3 mmnodule 

in the right major fissure (series 6 image 75).  A couple of 2 mm subpleural 

right upper lobe nodules are identified (series 6 image 65). There is a 3 mm 

nodule is seen in the lingula (series 6 image 78). Lungs are otherwise clear.  

No pleural effusions or pneumothorax.  Central and peripheral airways are 

patent.  


 


Mediastinum:  Heart size is normal, without pericardial effusion.  No 

mediastinal or hilar adenopathy.  Thoracic aorta is normal in caliber and 

enhancement.  Esophagus is normal in caliber, without hiatal hernia.  


 


Bones and chest wall: There is a minimally displaced left L1 transverse process 

fracture, which is new since 1/23/3017.  Ribs and thoracic spine appear intact 

throughout.  Thyroid gland is normal.  No axillary or supraclavicular 

adenopathy.  


 


Abdomen:  There is a 1.3 cm low-density nodule in the posterior segment right 

hepatic lobe, unchanged. Probable 8 mm right adrenal adenoma. 


 


IMPRESSION:


 


1. No evidence for central pulmonary embolus.


 


2. Multiple small lung nodules bilaterally as described. Recommend followup 

based on risk profile (see recommendation).


 


3. Minimally displaced left L1 transverse process fracture.


 


 


Fleischner Society criteria for SOLID lung nodule followup.  


Nodule size (mm)Low-risk patientHigh-risk [id number] follow-up neededFollow-up 

at 12 mo; if no change, no further follow-up>3-3Geaeft-tt CT at 12 mo; if no 

change, no further follow-up needed.Initial follow-up CT at 6-12 mo, then 18-24 

mo if no change.  >6-8Initial follow-up CT at 6-12 mo, then 18-24 mo if no 

change. Initial follow-up CT at 3-6 mo, then 9-12 mo and 24 mo if no change.  >

8Follow-up CT at 3, 9, 24 mo.  Or PET and/or biopsy.Same as for low-risk pts.  


Fleischner Society criteria for SUB-SOLID lung nodule followup.  


Solitary pure ground-glass nodules5 mm or lessNo followup needed.  >5 mm3 mo 

follow-up CT to confirm persistence.  Then annual CT for 3 years.  Part-solid 

nodules3 mo follow-up CT to confirm persistence.  If persistent with solid 

component <5 mm, annual CT for at least 3 years.  If solid component is 5 mm or 

more, biopsy or surgical resection.  Consider PET-CT for lesions > 10 mm.  

Multiple sub-solid nodulesPure ground glass nodules 5 mm or lessFollowup CT at 

2 and 4 years.  Pure ground glass nodules >5 mm without dominant lesion.  3 

month followup CT to confirm persistence, then annual followup CT for at least 

3 years.  Dominant nodule(s) with part-solid or solid component.  3 month 

followup CT to confirm persistence.  If persistent, consider biopsy or surgical 

resection, dunia if lesions have >5 mm solid component.  


 





PROCEDURE:  X-RAY CHEST ONE VIEW, PORTABLE 


 


IMPRESSION:  No acute cardiopulmonary disease.  





Dictated by:  Mendel Gallardo RRA Interpreted: Fany Zuniga MD on 3/13/2017 at 10:

09   


Transcribed by:  SHYANNE on 3/13/2017 at 10:10





Assessment & Plan


Ms. Rucker is a 66-year-old female presenting with symptomatic palpitations and 

observed short bursts of ventricular tachycardia/paired PVCs and admitted for 

cardiac workups. Hospital Day #2. 





1. Nonsustained wide complex tachyarrhythmia, symptomatic. Present on 

admission. Active.


- Initial EKG showed some  sinus pauses, PACs and PVCs, along with the short 

bursts of ventricular tachycardia. Repeat EKG was normal.


- Echocardiogram today. If reassuring, will proceed with a treadmill EKG 

tomorrow 3/14/17.


- Cardiology was consulted and their recommendations are appreciated.


- Thyroid test was normal per H&P. 


- Continue with Metoprolol 50mg BID. Hold today evening and tomorrow morning 

doses if plan for stress test.


- NPO after midnight.





2. Hypertension, chronic, stable. 


- Continue with metoprolol and holding valsartan/amlodipine for now.


- Will consult with cardiology about using only Metoprolol at discharge. 





3. Hyperlipidemia, chronic, presume stable.


- Continue atorvastatin.





4. Elevated D-dimer, present on admission.


- D-dimer of 1.0 on admission. 


- CT angiogram ruled out pulmonary embolism.





5. Multiple lung nodules, as evidence on CT chest, unknown chronicity, active.


- CT chest on 3/12/17 showed multiple lung nodules (3 total) around 2-3mm.


- Willl need to follow up as outpatient. Will likely need a follow-up CT in 6-

12 months.





6. Minimally displaced left L1 transverse process fracture, unknown chronicity, 

active.


- Left L1 transverse process fracture noted incidentally on CT angiogram.


- Patient had 2 GLF's in the last month. No pain complaint at this time. Follow 

up as outpatient.


- Tylenol PRN pain





7. Abnormal CT scan, unknown chronicity, active.


- There is a 1.3 cm low-density nodule in the posterior segment right hepatic 

lobe, unchanged. Probable 8 mm right adrenal adenoma. 


- Follow up as outpatient. Patient knew about this since the last CT in 1/2017.


- LFTs have been wnl.





Dispo: patient will likely be discharged from the hospital in 1-2 days for 

cardiac workups. No other anticipated need.


Pain Evaluation:  Adequate Pain Control


GI Prophylaxis:  Not indicated


VTE Prophylaxis:  SCDs


Resuscitation Status:  CPR: Attempt Resuscitation


Attending Statement


The patient was seen and examined together with Dr. Loco on 3-13-17 and I 

agree with the history, exam and plan as outlined in the note above.








Julia Loco DO Mar 13, 2017 09:20


JHONATHAN Mccauley MD Mar 14, 2017 16:33

## 2017-03-14 VITALS
HEART RATE: 68 BPM | SYSTOLIC BLOOD PRESSURE: 141 MMHG | OXYGEN SATURATION: 94 % | RESPIRATION RATE: 18 BRPM | DIASTOLIC BLOOD PRESSURE: 85 MMHG

## 2017-03-14 VITALS
OXYGEN SATURATION: 95 % | SYSTOLIC BLOOD PRESSURE: 119 MMHG | DIASTOLIC BLOOD PRESSURE: 75 MMHG | HEART RATE: 68 BPM | RESPIRATION RATE: 18 BRPM

## 2017-03-14 VITALS
DIASTOLIC BLOOD PRESSURE: 58 MMHG | SYSTOLIC BLOOD PRESSURE: 104 MMHG | OXYGEN SATURATION: 95 % | RESPIRATION RATE: 14 BRPM | HEART RATE: 64 BPM

## 2017-03-14 VITALS — HEART RATE: 64 BPM

## 2017-03-14 LAB
MAGNESIUM: 2.3 MG/DL (ref 1.6–2.6)
MCH RBC QN AUTO: 29.3 PG (ref 27–35)
MEAN CORPUSCULAR VOLUME: 89 FL (ref 81–100)
PLATELET COUNT: 245 BIL/L (ref 150–400)
TROPONIN T SERPL-MCNC: 0.01 UG/L (ref 0–0.01)

## 2017-03-14 RX ADMIN — TIMOLOL MALEATE SCH DROP: 5 SOLUTION/ DROPS OPHTHALMIC at 08:30

## 2017-03-14 NOTE — PCM.DC.MED
Discharge Summary


Date of Service


Mar 14, 2017


Dates of Hospitalization


Date of Hospital Admission


Mar 12, 2017 at 19:07


Date of Discharge:  Mar 14, 2017


Providers:


Admitting Physician: KAVITA Paul MD


Primary Care Physician: Anita Kern DO 


Attending Physician: KAVITA Paul MD





Diagnosis at Time of Discharge


Diagnosis at Time of Discharge


1. Wide complex tachyarrhythmia, symptomatic. Present on admission. Active.


2. Hypertension, chronic, stable. 


3. Hyperlipidemia, chronic, presume stable.


4. Elevated D-dimer, present on admission, ruled out Pulmonary Embolism.


5. Multiple lung nodules, as evidence on CT chest, unknown chronicity, active.


6. Minimally displaced left L1 transverse process fracture, unknown chronicity, 

active.


7. Abnormal CT scan, unknown chronicity, active.





Consultations


Cardiology





Procedures


XRay, CTs & MRIs


PROCEDURE:  CT ANGIO CHEST PULMONARY EMBOLISM 


 


FINDINGS:  


 


Pulmonary arteries:  Pulmonary arteries are normal in size, and demonstrate no 

intraluminal filling defects to suggest central pulmonary embolism.  


 


Lungs and pleura:  Multiple lung nodules are identified. There is a 3 mmnodule 

in the right major fissure (series 6 image 75).  A couple of 2 mm subpleural 

right upper lobe nodules are identified (series 6 image 65). There is a 3 mm 

nodule is seen in the lingula (series 6 image 78). Lungs are otherwise clear.  

No pleural effusions or pneumothorax.  Central and peripheral airways are 

patent.  


 


Mediastinum:  Heart size is normal, without pericardial effusion.  No 

mediastinal or hilar adenopathy.  Thoracic aorta is normal in caliber and 

enhancement.  Esophagus is normal in caliber, without hiatal hernia.  


 


Bones and chest wall: There is a minimally displaced left L1 transverse process 

fracture, which is new since 1/23/3017.  Ribs and thoracic spine appear intact 

throughout.  Thyroid gland is normal.  No axillary or supraclavicular 

adenopathy.  


 


Abdomen:  There is a 1.3 cm low-density nodule in the posterior segment right 

hepatic lobe, unchanged. Probable 8 mm right adrenal adenoma. 


 


IMPRESSION:


 


1. No evidence for central pulmonary embolus.


 


2. Multiple small lung nodules bilaterally as described. Recommend followup 

based on risk profile (see recommendation).


 


3. Minimally displaced left L1 transverse process fracture.


 


 


Fleischner Society criteria for SOLID lung nodule followup.  


Nodule size (mm)Low-risk patientHigh-risk [id number] follow-up neededFollow-up 

at 12 mo; if no change, no further follow-up>[id number] CT at 12 mo; if no 

change, no further follow-up needed.Initial follow-up CT at 6-12 mo, then 18-24 

mo if no change.  >6-8Initial follow-up CT at 6-12 mo, then 18-24 mo if no 

change. Initial follow-up CT at 3-6 mo, then 9-12 mo and 24 mo if no change.  >

8Follow-up CT at 3, 9, 24 mo.  Or PET and/or biopsy.Same as for low-risk pts.  


Fleischner Society criteria for SUB-SOLID lung nodule followup.  


Solitary pure ground-glass nodules5 mm or lessNo followup needed.  >5 mm3 mo 

follow-up CT to confirm persistence.  Then annual CT for 3 years.  Part-solid 

nodules3 mo follow-up CT to confirm persistence.  If persistent with solid 

component <5 mm, annual CT for at least 3 years.  If solid component is 5 mm or 

more, biopsy or surgical resection.  Consider PET-CT for lesions > 10 mm.  

Multiple sub-solid nodulesPure ground glass nodules 5 mm or lessFollowup CT at 

2 and 4 years.  Pure ground glass nodules >5 mm without dominant lesion.  3 

month followup CT to confirm persistence, then annual followup CT for at least 

3 years.  Dominant nodule(s) with part-solid or solid component.  3 month 

followup CT to confirm persistence.  If persistent, consider biopsy or surgical 

resection, dunia if lesions have >5 mm solid component.  


 





PROCEDURE:  X-RAY CHEST ONE VIEW, PORTABLE 


 


IMPRESSION:  No acute cardiopulmonary disease.  





Dictated by:  Mendel Gallardo RRBO Interpreted: Fany Zuniga MD on 3/13/2017 at 10:

09   


Transcribed by:  SHYANNE on 3/13/2017 at 10:10


Cardiac Echo Impression


Echo Interpretation Summary by Dr. Mena Riddle on 03/13/2017:


1. Normal left ventricular size, normal wall thickness and systolic function


with an estimated EF of 60-65%


2. Normal right ventricular size and systolic function.


3. No valvular pathology appreciated


 


There is no old study for comparison


Invasive Procedures


Stress test report pending.





Brief History


Per H&P:





This is a 66-year-old female presenting with symptomatic palpitations and


observed short bursts of ventricular tachycardia/paired PVCs.  She says that


she called the paramedics to her home at noon today because since 8 p.m. last


night she had been kept awake by palpitations.  There was no chest pain or


shortness of breath.  She just had this abnormal sensation.  She has had this


going on for about two years but it always goes away quickly and this time it


did not.  On evaluation of the emergency department report of [facility]


and on the reports from the medics, they did see a 6-beat run of ventricular


tachycardia, but were unable to capture it on a strip.  They said that their


1st rhythm strip showed a disorganized rhythm with what looked like PACs.  She 

is


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


irregularity.  Her rhythm here appears to be quite varied with sinus pauses of


up to not quite 2 seconds, PACs and PVCs, and a 3-4 beat run of multifocal PVCs


that I observed but also did not catch on the EKG because the tech helpfully did


not push the record button until the rhythm looked more normalized.  She had


not been placed on recorded telemetry as yet.  So she was evaluated thoroughly


at the emergency department  in [place], and Dr. Medina was contacted, who


advised that she undergo echocardiogram, treadmill EKG, and be started on


metoprolol.  Their hospital was unwilling to accept her for admission there


without a Cardiology backup, and so she was transferred here.  She was given


metoprolol 50 mg before leaving, and says that her palpitations resolved almost


completely with that treatment.  She is concerned that they will return and is


hopeful that she can receive more of that medicine.  She has no history of


heart disease and has no family history of heart disease.  She does not over-


indulge in caffeine, alcohol or any other illicit substances.  She is not on


any medications that would be expected to cause this.  Her only risk factors


are hypertension and hyperlipidemia.  She was also given 2 g of magnesium IV in


the emergency department, but no level had been done.





Hospital Course


Ms. Rucker is a 66-year-old female presenting with symptomatic palpitations and 

observed short bursts of ventricular tachycardia/paired PVCs and admitted for 

cardiac workups. 





1. Nonsustained wide complex tachyarrhythmia, symptomatic. Present on 

admission. Active.


- Initial EKG showed some  sinus pauses, PACs and PVCs, along with the short 

bursts of ventricular tachycardia. Repeat EKG was normal.


- Echocardiogram reassuring and treadmill stress test was done on 3/14/17. 

Report pending.


- Cardiology was consulted.


- Thyroid test was normal per H&P. 


- Continue with Metoprolol 50mg BID at discharge.


- Continue ASA 325mg daily





2. Hypertension, chronic, stable. 


- Continue with metoprolol and stop valsartan/amlodipine.





3. Hyperlipidemia, chronic, presume stable.


- Continue atorvastatin.





4. Elevated D-dimer, present on admission.


- D-dimer of 1.0 on admission. 


- CT angiogram ruled out pulmonary embolism.





5. Multiple lung nodules, as evidence on CT chest, unknown chronicity, active.


- CT chest on 3/12/17 showed multiple lung nodules (3 total) around 2-3mm.


- Willl need to follow up as outpatient. Will likely need a follow-up CT in 6-

12 months.





6. Minimally displaced left L1 transverse process fracture, unknown chronicity, 

active.


- Left L1 transverse process fracture noted incidentally on CT angiogram.


- Patient had 2 GLF's in the last month. No pain complaint at this time. Follow 

up as outpatient.





7. Abnormal CT scan, unknown chronicity, active.


- There is a 1.3 cm low-density nodule in the posterior segment right hepatic 

lobe, unchanged. Probable 8 mm right adrenal adenoma. 


- Follow up as outpatient. Patient knew about this since the last CT in 1/2017.


- LFTs have been wnl.





Exam





 Vital Signs (Last)








  Date Time  Temp Pulse Resp B/P Pulse Ox O2 Delivery O2 Flow Rate FiO2


 


3/14/17 07:42 37.0 68 18 119/75 95 Room Air  








Exam


General: Well-developed, well-nourished, in no acute distress


HEENT: atraumatic, external ears normal, scleral anicteric, conjunctiva pink/

moist, oral mucosa moist.


Neck: supple, no thyromegaly, no lymphadenopathy, no JVD


Chest: clear to auscultation bilaterally, no wheezes/rales/rhonchi noted.


CV: regular rate and rhythm, no murmurs/gallops appreciated.


Abdomen: soft, nondistended, nontender, normoactive bowel sound throughout, no 

guarding, no organomegaly.


Extremities: no edema, clubbing, or cyanosis


Neuro: balanced, coordinated gait. Moving all extremities spontaneously. Normal 

motor strength. Sensation to light touch intact.


Skin: no rash, lesions, or ulcer noted.











Test


  3/12/17


20:20 3/12/17


22:00 3/14/17


05:17


 


D-Dimer


  1.0mg/L


(<0.50) 


  


 


 


Urine Color  Straw (YELLOW)  


 


Urine Appearance


  


  Clear


(CLEAR,HAZY) 


 


 


Urine pH  6.0 (5.0-8.0)  


 


Urine Specific Gravity


  


  1.010


(1.003-1.035) 


 


 


Urine Protein


  


  Negativemg/dL


(NEG,TRACE) 


 


 


Urine Glucose (UA)


  


  Negativemg/dL


(NEGATIVE) 


 


 


Urine Ketones


  


  15mg/dL


(NEGATIVE) 


 


 


Urine Occult Blood


  


  Trace


(NEGATIVE) 


 


 


Urine Nitrite


  


  Negative


(NEGATIVE) 


 


 


Urine Bilirubin


  


  Negative


(NEGATIVE) 


 


 


Urine Urobilinogen


  


  Normalmg/dL


(NORMAL) 


 


 


Urine Leukocyte Esterase


  


  Trace


(NEGATIVE) 


 


 


Urine RBC  0-2/hpf (0-2)  


 


Urine WBC  0-5/hpf (0-5)  


 


Urine Epithelial Cells


  


  Occasional/hpf


(NONE-MOD) 


 


 


Urine Crystals


  


  None seen


(NONE SEEN) 


 


 


Urine Bacteria


  


  None/hpf


(NONE-FEW) 


 


 


Urine Hyaline Casts


  


  None/lpf


(NONE) 


 


 


Urine Granular Casts


  


  None seen


(NONE SEEN) 


 


 


Urine Waxy Casts


  


  None seen


(NONE SEEN) 


 


 


Urine Red Blood Cell Casts


  


  None seen


(NONE SEEN) 


 


 


Urine White Blood Cell Casts


  


  None seen


(NONE SEEN) 


 


 


Urine Mucus


  


  None seen


(None Seen) 


 


 


Urine Trichomonas


  


  None seen


(NONE SEEN) 


 


 


Urine Yeast


  


  None (NONE


SEEN) 


 


 


Urine Culture Reflexed  Indicated  


 


White Blood Count


  


  


  6.7th/mm3


(3.8-10.1)


 


Red Blood Count


  


  


  4.84mil/mm3


(3.90-5.20)


 


Hemoglobin


  


  


  14.2g/dL


(12.0-15.6)


 


Hematocrit


  


  


  43.1%


(35.0-46.0)


 


Mean Corpuscular Volume


  


  


  89.0fL


()


 


Mean Corpuscular Hemoglobin


  


  


  29.3pg


(27.0-35.0)


 


Mean Corpuscular Hemoglobin


Concent 


  


  32.9%


(32.0-37.0)


 


Red Cell Distribution Width


  


  


  13.9%


(12.3-15.4)


 


Platelet Count


  


  


  245bil/L


(150-400)


 


Sodium Level


  


  


  140mEq/L


(134-144)


 


Potassium Level


  


  


  3.8mEq/L


(3.5-5.2)


 


Chloride Level


  


  


  106mEq/L


()


 


Carbon Dioxide Level


  


  


  20mmol/L


(18-29)


 


Blood Urea Nitrogen   17mg/dL (8-27) 


 


Creatinine


  


  


  0.72mg/dL


(0.57-1.00)


 


Estimat Glomerular Filtration


Rate 


  


  116mL/min


(>59)


 


Glucose Level


  


  


  112mg/dL


(60-99)


 


Calcium Level


  


  


  9.6mg/dL


(8.5-10.1)


 


Magnesium Level


  


  


  2.3mg/dL


(1.6-2.6)


 


Total Bilirubin


  


  


  0.9mg/dL


(0.0-1.2)


 


Aspartate Amino Transf


(AST/SGOT) 


  


  14U/L (0-50) 


 


 


Alanine Aminotransferase


(ALT/SGPT) 


  


  14U/L (0-32) 


 


 


Alkaline Phosphatase   61U/L () 


 


Troponin T


  


  


  0.010ug/L


(0.0-0.011)


 


Total Protein


  


  


  5.8g/dL


(6.4-8.4)


 


Albumin


  


  


  3.5g/dL


(3.4-5.0)











Discharge Medications


Discharge Medications


Aspirin (Aspirin) 325 Mg Tablet 325 MG PO DAILY 


   Prescribed by: WOLF DUMONT DO


Metoprolol Tartrate (Metoprolol Tartrate) 50 Mg Tablet 50 MG PO BID 


   Prescribed by: WOLF DUMONT DO





Miscellaneous Medications


Atorvastatin Calcium (Atorvastatin Calcium) 10 Mg Tablet (Reported) 


Bimatoprost (Lumigan) 45 Drop/2.5 Ml Ophsoln (Reported) 


Estradiol (Estradiol) 0.5 Mg Tablet (Reported) 


Magnesium Amino Acid Chelate (Magnesium) 100 Mg Tablet 100 MG PO (Reported) 


Multivitamin/Iron/Folic Acid (Centrum Complete Multivit Tab) 1 Each Tablet 1 

EACH PO (Reported) 


Timolol Maleate (Timolol Maleate) 5 Ml Drops (Reported) 


Additional med instructions


- Stop taking the home Amlodipine-Valsartan. We started you on a new medication 

called Metoprolol 50mg to help control your heart rate better. Take it as 

directed.


- Continue to take the Atorvastatin 10mg 1 tab PO QHS.


- We started you on Aspirin 325mg 1 tab PO daily. You can talk to your PCP 

about reducing it to 81mg daily. 


- Resume your other medications as directed.





Followup Plan


Disposition:


Home


Follow-up plan


Follow up with your primary care doctor in 1 week. You might want to discuss 

with Dr. Kern about a cardiology referral if this continues to be an issue.


Given the abnormal CT scan, you will need to have a follow-up CT in a year to 

monitor the lung (3) and liver (1) nodules.


Incidentally, there was a minimally displaced left L1 transverse process 

fracture found on the CT. Follow up with your PCP for pain management.


Go to the ER if you develop severe palpitations, chest pain, shortness of breath

, nausea, or vomiting.


Discharge Diet:  Heart Healthy


Discharge Activity:  No restrictions


Patient Instructions


- You were found to have palpitations due to brief, nonsustained 

tachyarrhythmias (fast, irregular heart rate and rhythm). This seems to improve 

on the day of discharge as you did not have any episode of wide complex 

tachycardia overnight.


- There is no obvious trigger found, but you should reduce your caffein intake.


- You Echo cardiogram was normal with no structural abnormality and the 

systolic function EF was 60-65%. 


- The stress test done today on 3/14/2017 was also normal.


- Per the cardiologist's recommendation, you will need to keep the Magnesium 

above 2 and potassium greater than 4. You will need to have a BMP in 2 weeks.


- You should follow up with your PCP for lipid panel check to keep the LDL less 

than 100.


Follow-up Provider:  Anita Kern DO


Follow-up with PCP in:  1 week


Attending Statement


The patient was seen and examined together with Dr. Dumont on 3-14-17 and I 

agree with the history, exam and plan as outlined in the note above.


copies to:   Anita Kern Ngochanh H DO Mar 14, 2017 11:36


JHONATHAN Mccauley MD Mar 15, 2017 07:07

## 2017-03-14 NOTE — DRSVH
PROCEDURE:  ONE DAY TREADMILL STRESS TEST.  Rest and exercise myocardial perfusion SPECT with gated i
maging and ejection fraction

 

RADIOPHARMACEUTICAL:  8.63 mCi of Tc-99m tetrofosmin IV at rest and 25.8 mCi of Tc-99m tetrofosmin IV
 at peak exercise.  One-day-protocol was performed.  

 

INDICATIONS:  DYSRHYTHMIA

 

TECHNIQUE:  Radiopharmaceutical was injected at peak stress test and also at rest.  SPECT images were
 obtained.  SPECT myocardial perfusion images were displayed in short axis, horizontal long axis, and
 vertical long axis views.  Gated images were reviewed using [employer] software.  

 

COMPARISON:  None.

 

CARDIAC STRESS:  A standard John treadmill exercise tolerance test was performed by the patient unde
r the supervision of the attending staff.  The patient exercised for 5 minutes and 11 seconds with a 
functional aerobic impairment (DENISHA) of +13%.  

 

Hemodynamic Data:  There is normal blood pressure and heart rate response to exercise stress.  The pa
tient achieved 106% of maximum predicted heart rate at peak exercise.  

 

Symptoms:  The patient felt fatigue and dyspnea during exercise.  

 

EKG:  Baseline rhythm was sinus with nonspecific ST depression in the inferolateral leads and nonspec
ific ST-T changes.  During exercise, I did not see any obvious inducible ischemic changes, however, t
he patient had some PACs and frequent PVCs including ventricular couplets and several 3-beat runs of 
nonsustained ventricular tachycardia.  There was no sustained ventricular tachycardia.  

 

FINDINGS:  

 

Raw Data:  There appears to be adequate myocardial uptake.  

 

Left Ventricular Function:  Stres sLV EF 91%. LV EDV 47 ml .  No obvious wall motion abnormalities.  
On visual inspection, I do not see any obvious transient ischemic dilatation.  

 

Myocardial Perfusion:  There was normal myocardial perfusion. 

 

IMPRESSION:  The patient has normal myocardial perfusion.  Left ventricular function is hyperdynamic.
  There was no obvious inducible ischemic change on surface electrocardiogram.  The patient had frequ
ent premature ventricular contractions including ventricular couplets and 3-beat runs of nonsustained
 ventricular tachycardia during exercise and the early recovery period.  As far as the perfusion scan
 is concerned, this is a low-risk myocardial perfusion scan.  Consider echocardiogram to rule out any
 structural heart disease.  Discussed the findings with the hospitalist team.

 

Dictated by:  Will Perdomo M.D. on 3/14/2017 at 17:24   

Transcribed by: SILVER on 3/15/2017 at 0:15    

Approved by: Will Perdomo M.D. on 3/15/2017 at 17:15

## 2017-03-14 NOTE — NUR
Telemetry 



No reports of VT this shift. Pt resting throughout shift between interventions; NPO; denies 
pain, dyspnea, or palpitations. Independent in room. VSS. Tele SR 70s.

## 2017-03-14 NOTE — NUR
Social Work Note: Initial Assessment





Data& Assessment: EMR reviewed. SW met with pt at bedside to discuss discharge planning, SW 
role explained. Alka Mackenzie is a 66 year old female admitted on 03/12/2017 for 
intermittent VT. Pt has Medicare and A[employer] Supplement. Pt sees Anita Rashid DO for primary 
care. Pt lives in [place] with her  and is independent at baseline. Pt does not 
use any DME and does not have SNF or HH hx. Pt does not have LTC insurance or VA benefits. 
Pt will be transported home privately by her  when medically ready. Pt reports she 
has completed DPOA/AD paperwork and agrees to bring in a copy for her chart when possible. 
Pt denies any other needs at this time. SW to continue to follow if any needs arise.



Plan: Anticipated discharge home via POV when medically ready.  Pt denies any other needs at 
this time. SW to continue to follow if any needs arise.





MARIA E Edmonds

-------------------------------------------------------------------------------

Addendum: 03/14/17 at 1516 by HEATHER GAN

-------------------------------------------------------------------------------

Amended: Links added.

## 2017-03-14 NOTE — NUR
Discharge of patient

Post stress Test pt stating having palpitation.  Pt denied dizziness, feeling faint or chest 
pain.  Tele showing pt having PVCs and 4 beats of Vtach.  MD aware, orders to discharge 
patient with metoprolol prescription.  Reviewed discharge instructions with patient and 
patient's .  Patient verbalized understanding however wanting to talk to Doctor 
before discharge for more information on her condition. Doctor made aware and able to answer 
patient's questions before discharge.  Pt stating being satisfied.  Pt discharge by walking 
out of hospital with prescriptions and instructions.  IV and telemetry previously 
discontinued.  Pt left hospital with  to home self care.

## 2018-06-05 ENCOUNTER — HOSPITAL ENCOUNTER (EMERGENCY)
Dept: HOSPITAL 76 - ED | Age: 68
LOS: 1 days | Discharge: HOME | End: 2018-06-06
Payer: MEDICARE

## 2018-06-05 DIAGNOSIS — Z79.82: ICD-10-CM

## 2018-06-05 DIAGNOSIS — I10: ICD-10-CM

## 2018-06-05 DIAGNOSIS — E78.00: ICD-10-CM

## 2018-06-05 DIAGNOSIS — N20.0: Primary | ICD-10-CM

## 2018-06-05 DIAGNOSIS — K57.90: ICD-10-CM

## 2018-06-05 LAB
ALBUMIN DIAFP-MCNC: 3.7 G/DL (ref 3.2–5.5)
ALBUMIN/GLOB SERPL: 1.3 {RATIO} (ref 1–2.2)
ALP SERPL-CCNC: 65 IU/L (ref 42–121)
ALT SERPL W P-5'-P-CCNC: 32 IU/L (ref 10–60)
ANION GAP SERPL CALCULATED.4IONS-SCNC: 7 MMOL/L (ref 6–13)
AST SERPL W P-5'-P-CCNC: 21 IU/L (ref 10–42)
BASOPHILS NFR BLD AUTO: 0.1 10^3/UL (ref 0–0.1)
BASOPHILS NFR BLD AUTO: 1 %
BILIRUB BLD-MCNC: 1 MG/DL (ref 0.2–1)
BUN SERPL-MCNC: 23 MG/DL (ref 6–20)
CALCIUM UR-MCNC: 10 MG/DL (ref 8.5–10.3)
CHLORIDE SERPL-SCNC: 103 MMOL/L (ref 101–111)
CLARITY UR REFRACT.AUTO: CLEAR
CO2 SERPL-SCNC: 24 MMOL/L (ref 21–32)
CREAT SERPLBLD-SCNC: 1 MG/DL (ref 0.4–1)
EOSINOPHIL # BLD AUTO: 0.1 10^3/UL (ref 0–0.7)
EOSINOPHIL NFR BLD AUTO: 1.4 %
ERYTHROCYTE [DISTWIDTH] IN BLOOD BY AUTOMATED COUNT: 13.9 % (ref 12–15)
GFRSERPLBLD MDRD-ARVRAT: 55 ML/MIN/{1.73_M2} (ref 89–?)
GLOBULIN SER-MCNC: 2.8 G/DL (ref 2.1–4.2)
GLUCOSE SERPL-MCNC: 118 MG/DL (ref 70–100)
GLUCOSE UR QL STRIP.AUTO: NEGATIVE MG/DL
HGB UR QL STRIP: 13.9 G/DL (ref 12–16)
KETONES UR QL STRIP.AUTO: NEGATIVE MG/DL
LIPASE SERPL-CCNC: 32 U/L (ref 22–51)
LYMPHOCYTES # SPEC AUTO: 1.5 10^3/UL (ref 1.5–3.5)
LYMPHOCYTES NFR BLD AUTO: 17.2 %
MAGNESIUM SERPL-MCNC: 2.3 MG/DL (ref 1.7–2.8)
MCH RBC QN AUTO: 29.5 PG (ref 27–31)
MCHC RBC AUTO-ENTMCNC: 32.9 G/DL (ref 32–36)
MCV RBC AUTO: 89.8 FL (ref 81–99)
MONOCYTES # BLD AUTO: 0.5 10^3/UL (ref 0–1)
MONOCYTES NFR BLD AUTO: 6 %
NEUTROPHILS # BLD AUTO: 6.5 10^3/UL (ref 1.5–6.6)
NEUTROPHILS # SNV AUTO: 8.7 X10^3/UL (ref 4.8–10.8)
NEUTROPHILS NFR BLD AUTO: 74.4 %
NITRITE UR QL STRIP.AUTO: NEGATIVE
PDW BLD AUTO: 8 FL (ref 7.9–10.8)
PH UR STRIP.AUTO: 6 PH (ref 5–7.5)
PHOSPHATE BLD-MCNC: 3.1 MG/DL (ref 2.5–4.6)
PLATELET # BLD: 227 10^3/UL (ref 130–450)
PROT SPEC-MCNC: 6.5 G/DL (ref 6.7–8.2)
PROT UR STRIP.AUTO-MCNC: NEGATIVE MG/DL
RBC # UR STRIP.AUTO: (no result) /UL
RBC # URNS HPF: (no result) /HPF (ref 0–5)
RBC MAR: 4.71 10^6/UL (ref 4.2–5.4)
SODIUM SERPLBLD-SCNC: 134 MMOL/L (ref 135–145)
SP GR UR STRIP.AUTO: <=1.005 (ref 1–1.03)
SQUAMOUS URNS QL MICRO: (no result)
UROBILINOGEN UR QL STRIP.AUTO: (no result) E.U./DL
UROBILINOGEN UR STRIP.AUTO-MCNC: NEGATIVE MG/DL

## 2018-06-05 PROCEDURE — 87086 URINE CULTURE/COLONY COUNT: CPT

## 2018-06-05 PROCEDURE — 51798 US URINE CAPACITY MEASURE: CPT

## 2018-06-05 PROCEDURE — 85025 COMPLETE CBC W/AUTO DIFF WBC: CPT

## 2018-06-05 PROCEDURE — 51702 INSERT TEMP BLADDER CATH: CPT

## 2018-06-05 PROCEDURE — 84100 ASSAY OF PHOSPHORUS: CPT

## 2018-06-05 PROCEDURE — 99283 EMERGENCY DEPT VISIT LOW MDM: CPT

## 2018-06-05 PROCEDURE — 83690 ASSAY OF LIPASE: CPT

## 2018-06-05 PROCEDURE — 74176 CT ABD & PELVIS W/O CONTRAST: CPT

## 2018-06-05 PROCEDURE — 99284 EMERGENCY DEPT VISIT MOD MDM: CPT

## 2018-06-05 PROCEDURE — 80053 COMPREHEN METABOLIC PANEL: CPT

## 2018-06-05 PROCEDURE — 81001 URINALYSIS AUTO W/SCOPE: CPT

## 2018-06-05 PROCEDURE — 36415 COLL VENOUS BLD VENIPUNCTURE: CPT

## 2018-06-05 PROCEDURE — 83735 ASSAY OF MAGNESIUM: CPT

## 2018-06-05 PROCEDURE — 81003 URINALYSIS AUTO W/O SCOPE: CPT

## 2018-06-05 NOTE — CT REPORT
EXAM:

CT ABDOMEN AND PELVIS (CT KUB)

 

EXAM DATE: 6/5/2018 11:31 PM.

 

CLINICAL HISTORY: Flank pain, urinary retention.

 

COMPARISONS: None.

 

TECHNIQUE: Routine axial helical CT imaging was performed through the abdomen and pelvis without IV c
ontrast. Reconstructions: Coronal and sagittal.

 

In accordance with CT protocol optimization, one or more of the following dose reduction techniques w
ere utilized for this exam: automated exposure control, adjustment of mA and/or KV based on patient s
ize, or use of iterative reconstructive technique.

 

FINDINGS: 

Lung Bases: Unremarkable.

 

Right Kidney/Ureter: There is a 5 mm calyceal stone at the mid aspect of the right kidney. No obstruc
ting ureteral stones.

 

Left Kidney/Ureter: There is a 4 x 3 mm stone at the vesicoureteral junction resulting in mild collec
ting system dilatation.

 

Other Solid Organs: Noncontrast images of the solid organs are grossly unremarkable.

 

Gallbladder/Bile Ducts: Unremarkable.

 

Peritoneal Cavity: Diverticulosis, no evidence of diverticulitis. 

 

Pelvic Organs: No bladder stones or wall thickening. Noncontrast images of the visualized pelvic orga
ns are unremarkable. Teresa balloon catheter noted.

 

Vasculature: Unremarkable.

 

Other: None.

 

IMPRESSION: 

 

1. Mildly obstructing 4 x 3 mm left vesicoureteral junction stone.

2. Right nephrolithiasis.

3. Diverticulosis.

 

[facility]

Referring Provider Line: [phone]

 

SITE ID: [id number]

## 2018-06-05 NOTE — CT PRELIMINARY REPORT
Accession: [id number]

Exam: CT ABDOMEN/PELVIS W/O

 

IMPRESSION: 

 

1. Mildly obstructing 4 x 3 mm left vesicoureteral junction stone.

2. Right nephrolithiasis.

3. Diverticulosis.

 

[facility]

 

SITE ID: [id number]

## 2018-06-06 VITALS — SYSTOLIC BLOOD PRESSURE: 150 MMHG | DIASTOLIC BLOOD PRESSURE: 81 MMHG

## 2018-06-06 NOTE — ED PHYSICIAN DOCUMENTATION
PD HPI ABD PAIN





- Stated complaint


Stated Complaint: FEMALE /BK PX





- Chief complaint


Chief Complaint: General





- History obtained from


History obtained from: Patient





- History of Present Illness


Timing - onset: Today


Timing - details: Gradual onset, Now resolved, Intermittant


Quality: Cramping


Location: Suprapubic


Radiation: Left flank


Associated symptoms: No: Fever, Nausea, Vomiting


Similar symptoms before: Has not had sx before


Recently seen: Not recently seen





- Additional information


Additional information: 


Patient is a 68 year old female with a history of interstitial cystitis who is 

presenting to the emergency department for abdominal pain, flank pain and 

urinary retention.  Patient sates that her symptoms started today.  Patient 

denies aggravating or alleviating factors and denies ever having symptoms like 

this before.   








Review of Systems


Ten Systems: 10 systems reviewed and negative


Constitutional: denies: Fever, Chills


GI: reports: Abdominal Pain.  denies: Nausea, Vomiting, Constipation


: reports: Unable to Void





PD PAST MEDICAL HISTORY





- Past Medical History


Cardiovascular: Hypertension, High cholesterol


Respiratory: Asthma, Pneumonia


HEENT: Glaucoma





- Past Surgical History


Past Surgical History: Yes


/GYN: Hysterectomy


HEENT: Tonsil/Adenoidectomy





- Present Medications


Home Medications: 


 Ambulatory Orders











 Medication  Instructions  Recorded  Confirmed


 


Amlodipine/Valsartan [Exforge 1 each PO DAILY 10/29/15 03/12/17





5-160 mg Tablet]   


 


Estradiol [Estrace] 0.5 mg PO DAILY 10/29/15 03/12/17


 


Aspirin 1 tab DAILY PRN 12/13/15 03/12/17


 


diazePAM [Valium] 5 mg PO TID PRN #15 tablet 12/13/15 03/12/17


 


Ketorolac [Toradol] 10 mg PO Q6H #10 tablet 06/06/18 














- Allergies


Allergies/Adverse Reactions: 


 Allergies











Allergy/AdvReac Type Severity Reaction Status Date / Time


 


No Known Drug Allergies Allergy   Verified 06/05/18 21:41














- Social History


Does the pt smoke?: No


Smoking Status: Never smoker


Does the pt drink ETOH?: Yes


Does the pt have substance abuse?: No





- Immunizations


Immunizations are current?: No


Immunizations: TDAP >10years/unknown





PD ED PE NORMAL





- Vitals


Vital signs reviewed: Yes





- General


General: Alert and oriented X 3, No acute distress





- HEENT


HEENT: Atraumatic, Moist mucous membranes





- Neck


Neck: Supple, no meningeal sign





- Cardiac


Cardiac: RRR





- Respiratory


Respiratory: No respiratory distress





- Derm


Derm: Normal color





- Extremities


Extremities: No deformity





- Neuro


Neuro: Alert and oriented X 3


Eye Opening: Spontaneous


Motor: Obeys Commands





Results





- Vitals


Vitals: 


 Vital Signs - 24 hr











  06/05/18





  21:39


 


Temperature 36.7 C


 


Heart Rate 68


 


Respiratory 18





Rate 


 


Blood Pressure 159/102 H


 


O2 Saturation 98








 Oxygen











O2 Source                      Room air

















- Labs


Labs: 


 Laboratory Tests











  06/05/18 06/05/18 06/05/18





  22:00 23:05 23:05


 


WBC   8.7 


 


RBC   4.71 


 


Hgb   13.9 


 


Hct   42.3 


 


MCV   89.8 


 


MCH   29.5 


 


MCHC   32.9 


 


RDW   13.9 


 


Plt Count   227 


 


MPV   8.0 


 


Neut # (Auto)   6.5 


 


Lymph # (Auto)   1.5 


 


Mono # (Auto)   0.5 


 


Eos # (Auto)   0.1 


 


Baso # (Auto)   0.1 


 


Absolute Nucleated RBC   0.00 


 


Nucleated RBC %   0.0 


 


Sodium    134 L


 


Potassium    3.8


 


Chloride    103


 


Carbon Dioxide    24


 


Anion Gap    7.0


 


BUN    23 H


 


Creatinine    1.0


 


Estimated GFR (MDRD)    55 L


 


Glucose    118 H


 


Calcium    10.0


 


Phosphorus    3.1


 


Magnesium    2.3


 


Total Bilirubin    1.0


 


AST    21


 


ALT    32


 


Alkaline Phosphatase    65


 


Total Protein    6.5 L


 


Albumin    3.7


 


Globulin    2.8


 


Albumin/Globulin Ratio    1.3


 


Lipase    32


 


Urine Color  YELLOW  


 


Urine Clarity  CLEAR  


 


Urine pH  6.0  


 


Ur Specific Gravity  <=1.005  


 


Urine Protein  NEGATIVE  


 


Urine Glucose (UA)  NEGATIVE  


 


Urine Ketones  NEGATIVE  


 


Urine Occult Blood  SMALL H  


 


Urine Nitrite  NEGATIVE  


 


Urine Bilirubin  NEGATIVE  


 


Urine Urobilinogen  0.2 (NORMAL)  


 


Ur Leukocyte Esterase  NEGATIVE  


 


Urine RBC  0-5  


 


Urine WBC  0-3  


 


Ur Squamous Epith Cells  RARE Squamous  


 


Urine Bacteria  None Seen  


 


Ur Microscopic Review  INDICATED  


 


Urine Culture Comments  NOT INDICATED  














- Rads (name of study)


  ** ct abd pelvis


Radiology: Final report received (mildly obstucting 4mm by 3mm stone of left uvj

)





PD MEDICAL DECISION MAKING





- ED course


Complexity details: reviewed old records, reviewed results, re-evaluated patient

, considered differential, d/w patient, d/w family


ED course: 





Patient was seen and examined at bedside.  bladder scanner was performed and 

was above 500.  catheter was placed and about a liter of urine was produced.  

labs were drawn (patient took a daily supplement of magnesium).  Imaging was 

ordered.  when patient returned from imaging the results were reviewed.  

Patient was found to have a left sided stone.  Patient was made aware of the 

findings.  Patient was given detailed discharge and follow up instructions and 

was stable for discharge with outpatient follow up.  





Departure





- Departure


Disposition: 01 Home, Self Care


Clinical Impression: 


 Renal colic on right side





Condition: Good


Instructions:  ED Stone Renal W Colic


Follow-Up: 


SUKHWINDER MANDEL DO [Primary Care Provider] - Within 3 Days


Prescriptions: 


Ketorolac [Toradol] 10 mg PO Q6H #10 tablet


Comments: 


Your symptoms are being caused by a kidney stone and urinary retention.  Your 

stone was 3mm by 4mm and has a greater than 90% of passing on its own.  You 

should stay well hydrated over the next few days.  You should follow up with 

your doctor if your symptoms don't improve and may eventually need to see a 

urologist if this becomes more recurrent.  You may return to the emergency 

department at any time for new, worsening or uncontrollable symptoms.

## 2018-08-09 ENCOUNTER — HOSPITAL ENCOUNTER (OUTPATIENT)
Dept: HOSPITAL 76 - DI | Age: 68
Discharge: HOME | End: 2018-08-09
Attending: UROLOGY
Payer: MEDICARE

## 2018-08-09 DIAGNOSIS — N20.2: Primary | ICD-10-CM

## 2018-08-09 PROCEDURE — 74176 CT ABD & PELVIS W/O CONTRAST: CPT

## 2018-08-09 NOTE — CT REPORT
Procedure Date:  08/09/2018   

Accession Number:  [id number] / [id number]                    

Procedure:  CT  - Abdomen/Pelvis W/O CPT Code:  

 

FULL RESULT:

 

 

EXAM:

CT ABDOMEN AND PELVIS (CT KUB)

 

EXAM DATE: 8/9/2018 12:01 PM.

 

CLINICAL HISTORY: NEPHROLITHIASIS. Right flank pain.

 

COMPARISONS: 5 june 2018.

 

TECHNIQUE: Routine axial helical CT imaging was performed through the 

abdomen and pelvis without IV contrast. Reconstructions: Coronal and 

sagittal.

 

In accordance with CT protocol optimization, one or more of the following 

dose reduction techniques were utilized for this exam: automated exposure 

control, adjustment of mA and/or KV based on patient size, or use of 

iterative reconstructive technique.

 

FINDINGS:

Lung Bases: Unremarkable.

 

Right Kidney/Ureter: Right mid renal stone cluster measuring together 

about 3 x 5 millimeter, unchanged. No ureteral stone or hydronephrosis. 

Otherwise unremarkable.

 

left Kidney/Ureter: No stones, hydronephrosis, or hydroureter. No 

perinephric fat stranding.

 

Other Solid Organs: Small cyst or hemangioma in the liver. Noncontrast 

images of the solid organs otherwise are grossly unremarkable.

 

Gallbladder/Bile Ducts: Unremarkable.

 

Peritoneal Cavity: Moderate colonic diverticulosis. No diverticulitis at 

this time. Normal appendix. No free fluid, free air, or lymphadenopathy.

 

Pelvic Organs: No bladder stones or wall thickening. Noncontrast images 

of the visualized pelvic organs are unremarkable.

 

Vasculature: Unremarkable.

 

Other: None.

IMPRESSION:

1. Interval passage of left UVJ stone.

2. No change of small right renal stone cluster measuring together about 

3 x 5 mm. No hydronephrosis.

3. Moderate diverticulosis and other chronic or incidental findings.

 

[facility]

## 2019-04-16 ENCOUNTER — HOSPITAL ENCOUNTER (OUTPATIENT)
Age: 69
End: 2019-04-16
Payer: MEDICARE

## 2019-04-16 DIAGNOSIS — Z12.31: Primary | ICD-10-CM

## 2019-04-16 PROCEDURE — 77063 BREAST TOMOSYNTHESIS BI: CPT

## 2019-04-16 PROCEDURE — 77067 SCR MAMMO BI INCL CAD: CPT

## 2019-04-16 NOTE — DI.MG.S_ITS
BILATERAL DIGITAL SCREENING MAMMOGRAM 3D/2D WITH CAD: 4/16/2019  
   
CLINICAL: Routine screening.    
   
Comparison is made to exams dated:  3/13/2018 mammogram, 3/6/2017 mammogram, and   
2/25/2016 mammogram - [facility].  There are scattered fibroglandular elements in   
both breasts.    
   
Current study was also evaluated with a Computer Aided Detection (CAD) system.    
   
There are benign post operative findings in the left breast.    
No significant masses, calcifications, or other findings are seen in either breast.    
There has been no significant interval change.  
   
IMPRESSION:   
There is no mammographic evidence of malignancy. A 1 year screening mammogram is   
recommended.     
   
This exam was interpreted at Station ID: [id number].    
   
NOTE: For mammograms, a report in lay terms will be sent to the patient. Approximately   
15% of breast malignancies will not be visualized mammographically. In the management of   
a palpable breast mass, a negative mammogram must not discourage biopsy of a clinically   
suspicious lesion.  
   
Electronically Signed By: Abdulkadir calvillo/chase:4/16/2019 11:54:15    
   
   
letter sent: Normal Exam    
ACR BI-RADS Category 2: Benign Finding(s) 3342F

## 2021-04-06 ENCOUNTER — HOSPITAL ENCOUNTER (OUTPATIENT)
Age: 71
End: 2021-04-06
Payer: MEDICARE

## 2021-04-06 DIAGNOSIS — Z12.31: Primary | ICD-10-CM

## 2021-04-06 PROCEDURE — 77063 BREAST TOMOSYNTHESIS BI: CPT

## 2021-04-06 PROCEDURE — 77067 SCR MAMMO BI INCL CAD: CPT

## 2021-05-27 ENCOUNTER — HOSPITAL ENCOUNTER (EMERGENCY)
Dept: HOSPITAL 76 - ED | Age: 71
Discharge: HOME | End: 2021-05-27
Payer: MEDICARE

## 2021-05-27 VITALS — SYSTOLIC BLOOD PRESSURE: 136 MMHG | DIASTOLIC BLOOD PRESSURE: 74 MMHG

## 2021-05-27 DIAGNOSIS — Z79.82: ICD-10-CM

## 2021-05-27 DIAGNOSIS — I10: ICD-10-CM

## 2021-05-27 DIAGNOSIS — R11.2: ICD-10-CM

## 2021-05-27 DIAGNOSIS — R10.13: Primary | ICD-10-CM

## 2021-05-27 LAB
ALBUMIN DIAFP-MCNC: 4.4 G/DL (ref 3.2–5.5)
ALBUMIN/GLOB SERPL: 1.3 {RATIO} (ref 1–2.2)
ALP SERPL-CCNC: 47 IU/L (ref 42–121)
ALT SERPL W P-5'-P-CCNC: 31 IU/L (ref 10–60)
ANION GAP SERPL CALCULATED.4IONS-SCNC: 11 MMOL/L (ref 6–13)
AST SERPL W P-5'-P-CCNC: 23 IU/L (ref 10–42)
BASOPHILS NFR BLD AUTO: 0 10^3/UL (ref 0–0.1)
BASOPHILS NFR BLD AUTO: 0.4 %
BILIRUB BLD-MCNC: 2.1 MG/DL (ref 0.2–1)
BUN SERPL-MCNC: 15 MG/DL (ref 6–20)
CALCIUM UR-MCNC: 9.7 MG/DL (ref 8.5–10.3)
CHLORIDE SERPL-SCNC: 104 MMOL/L (ref 101–111)
CO2 SERPL-SCNC: 26 MMOL/L (ref 21–32)
CREAT SERPLBLD-SCNC: 0.9 MG/DL (ref 0.4–1)
EOSINOPHIL # BLD AUTO: 0 10^3/UL (ref 0–0.7)
EOSINOPHIL NFR BLD AUTO: 0.3 %
ERYTHROCYTE [DISTWIDTH] IN BLOOD BY AUTOMATED COUNT: 13.2 % (ref 12–15)
GFRSERPLBLD MDRD-ARVRAT: 62 ML/MIN/{1.73_M2} (ref 89–?)
GLOBULIN SER-MCNC: 3.3 G/DL (ref 2.1–4.2)
GLUCOSE SERPL-MCNC: 132 MG/DL (ref 70–100)
HCT VFR BLD AUTO: 49.3 % (ref 37–47)
HGB UR QL STRIP: 16.9 G/DL (ref 12–16)
LIPASE SERPL-CCNC: 26 U/L (ref 22–51)
LYMPHOCYTES # SPEC AUTO: 1.3 10^3/UL (ref 1.5–3.5)
LYMPHOCYTES NFR BLD AUTO: 11.7 %
MCH RBC QN AUTO: 30.7 PG (ref 27–31)
MCHC RBC AUTO-ENTMCNC: 34.3 G/DL (ref 32–36)
MCV RBC AUTO: 89.5 FL (ref 81–99)
MONOCYTES # BLD AUTO: 0.4 10^3/UL (ref 0–1)
MONOCYTES NFR BLD AUTO: 4 %
NEUTROPHILS # BLD AUTO: 9.1 10^3/UL (ref 1.5–6.6)
NEUTROPHILS # SNV AUTO: 11 X10^3/UL (ref 4.8–10.8)
NEUTROPHILS NFR BLD AUTO: 83.1 %
NRBC # BLD AUTO: 0 /100WBC
NRBC # BLD AUTO: 0 X10^3/UL
PDW BLD AUTO: 9.7 FL (ref 7.9–10.8)
PLATELET # BLD: 248 10^3/UL (ref 130–450)
POTASSIUM SERPL-SCNC: 4 MMOL/L (ref 3.5–5)
PROT SPEC-MCNC: 7.7 G/DL (ref 6.7–8.2)
RBC MAR: 5.51 10^6/UL (ref 4.2–5.4)
SODIUM SERPLBLD-SCNC: 141 MMOL/L (ref 135–145)

## 2021-05-27 PROCEDURE — 93005 ELECTROCARDIOGRAM TRACING: CPT

## 2021-05-27 PROCEDURE — 83690 ASSAY OF LIPASE: CPT

## 2021-05-27 PROCEDURE — 36415 COLL VENOUS BLD VENIPUNCTURE: CPT

## 2021-05-27 PROCEDURE — 96374 THER/PROPH/DIAG INJ IV PUSH: CPT

## 2021-05-27 PROCEDURE — 84484 ASSAY OF TROPONIN QUANT: CPT

## 2021-05-27 PROCEDURE — 80053 COMPREHEN METABOLIC PANEL: CPT

## 2021-05-27 PROCEDURE — 99284 EMERGENCY DEPT VISIT MOD MDM: CPT

## 2021-05-27 PROCEDURE — 85025 COMPLETE CBC W/AUTO DIFF WBC: CPT

## 2021-05-27 NOTE — EXTERNAL MEDICAL SUMMARY RPT
Continuity of Care Document

                             Created on:May 27, 2021



Patient:Christie Mcarthur

Sex:Female

:1950

External Reference #:[id number]





Demographics







                          Phone                     Unavailable

 

                          Preferred Language        English

 

                          Marital Status            Unknown

 

                          Mormon Affiliation     Unknown

 

                          Race                      Unknown

 

                          Ethnic Group              Unknown









Author







                          Organization              [facility]

 

                          Address                    [place] [id number]

 

                          Phone                     [phone]









Care Team Providers







                    Name                Role                Phone

 

                    Solomon               Unavailable         Unavailable









Allergies





Encounters





Medications





Problems







                     date                description         facility

 

                     2021            Encounter for screening mammogram for [facility]



                                        neoplasm of         







Results

## 2021-05-27 NOTE — ED PHYSICIAN DOCUMENTATION
PD HPI ABD PAIN





- Stated complaint


Stated Complaint: ABD PX, HIGH BP





- Chief complaint


Chief Complaint: Abd Pain





- History obtained from


History obtained from: Patient





- History of Present Illness


Timing - onset: Enter  time (0200), Last night


Timing - duration: Hours


Timing - details: Gradual onset, Still present


Quality: Sharp, Pain


Location: Epigastric


Radiation: Chest


Worsened by: Moving, Position, Palpation


Associated symptoms: Nausea, Vomiting


Similar symptoms before: No diagnosis


Recently seen: Not recently seen





- Additional information


Additional information: 





71-year-old female who is developed some epigastric pain nausea and vomiting has

become concerned about the elevation of her blood pressure.  She has had no 

further nausea or vomiting but still has elevated blood pressure.





Review of Systems


Constitutional: denies: Fever


Ears: denies: Ear pain


Nose: denies: Congestion


Throat: denies: Sore throat


Cardiac: reports: Chest pain / pressure.  denies: Palpitations


Respiratory: denies: Dyspnea, Cough


GI: reports: Abdominal Pain, Nausea, Vomiting.  denies: Abdominal Swelling


: denies: Dysuria, Frequency





PD PAST MEDICAL HISTORY





- Past Medical History


Past Medical History: Yes


Cardiovascular: Hypertension, High cholesterol


Respiratory: Asthma, Pneumonia


HEENT: Glaucoma





- Past Surgical History


Past Surgical History: Yes


/GYN: Hysterectomy


HEENT: Tonsil/Adenoidectomy





- Present Medications


Home Medications: 


                                Ambulatory Orders











 Medication  Instructions  Recorded  Confirmed


 


Amlodipine/Valsartan [Exforge 1 each PO DAILY 10/29/15 03/12/17





5-160 mg Tablet]   


 


Estradiol [Estrace] 0.5 mg PO DAILY 10/29/15 05/27/21


 


Aspirin 1 tab DAILY PRN 12/13/15 03/12/17


 


Atorvastatin [Lipitor] 10 mg PO DAILY 05/27/21 05/27/21


 


Metoprolol Succinate [Toprol Xl] 25 mg PO BID 05/27/21 05/27/21














- Allergies


Allergies/Adverse Reactions: 


                                    Allergies











Allergy/AdvReac Type Severity Reaction Status Date / Time


 


No Known Drug Allergies Allergy   Verified 05/27/21 04:55














- Social History


Does the pt smoke?: No


Smoking Status: Never smoker


Does the pt drink ETOH?: Yes


Does the pt have substance abuse?: No





- Immunizations


Immunizations are current?: No


Immunizations: TDAP >10years/unknown





- POLST


Patient has POLST: No





PD ED PE NORMAL





- Vitals


Vital signs reviewed: Yes (hypertensive )





- General


General: Alert and oriented X 3, No acute distress, Well developed/nourished





- HEENT


HEENT: Atraumatic, PERRL, EOMI





- Neck


Neck: Supple, no meningeal sign





- Cardiac


Cardiac: RRR, No murmur





- Respiratory


Respiratory: No respiratory distress, Clear bilaterally





- Abdomen


Abdomen: Normal bowel sounds, Soft, Non distended, No organomegaly, Other (mild 

epigastric tenderness )





- Back


Back: No CVA TTP, No spinal TTP





- Derm


Derm: Normal color, Warm and dry, No rash





- Extremities


Extremities: No deformity, No edema





- Neuro


Neuro: Alert and oriented X 3, CNs 2-12 intact, No motor deficit, No sensory 

deficit, Normal speech


Eye Opening: Spontaneous


Motor: Obeys Commands


Verbal: Oriented


GCS Score: 15





- Psych


Psych: Normal mood, Normal affect





Results





- Vitals


Vitals: 


                               Vital Signs - 24 hr











  05/27/21 05/27/21 05/27/21





  04:52 05:30 06:16


 


Temperature 36.0 C L 37 C 


 


Heart Rate 89 78 68


 


Respiratory 18 18 23





Rate   


 


Blood Pressure 152/98 H 140/83 H 119/77


 


Blood Pressure 152/86 H  





[Left]   


 


O2 Saturation 97 98 94














  05/27/21





  07:01


 


Temperature 


 


Heart Rate 78


 


Respiratory 24





Rate 


 


Blood Pressure 136/74 H


 


Blood Pressure 





[Left] 


 


O2 Saturation 96








                                     Oxygen











O2 Source                      Room air

















- EKG (time done)


  ** 0504


Rate: Rate (enter#) (79)


Rhythm: Other (sinus arrhythmia)


Ischemia: Non specific changes


Compare to prior EKG: Changed from prior EKG (SPT 3- the inverted T waves

 inferiorly have resolved. )


Computer interpretation: Agree with computer





- Labs


Labs: 


                                Laboratory Tests











  05/27/21 05/27/21 05/27/21





  05:20 05:20 05:20


 


WBC   11.0 H 


 


RBC   5.51 H 


 


Hgb   16.9 H 


 


Hct   49.3 H 


 


MCV   89.5 


 


MCH   30.7 


 


MCHC   34.3 


 


RDW   13.2 


 


Plt Count   248 


 


MPV   9.7 


 


Neut # (Auto)   9.1 H 


 


Lymph # (Auto)   1.3 L 


 


Mono # (Auto)   0.4 


 


Eos # (Auto)   0.0 


 


Baso # (Auto)   0.0 


 


Absolute Nucleated RBC   0.00 


 


Nucleated RBC %   0.0 


 


Sodium  141  


 


Potassium  4.0  


 


Chloride  104  


 


Carbon Dioxide  26  


 


Anion Gap  11.0  


 


BUN  15  


 


Creatinine  0.9  


 


Estimated GFR (MDRD)  62 L  


 


Glucose  132 H  


 


Calcium  9.7  


 


Total Bilirubin  2.1 H  


 


AST  23  


 


ALT  31  


 


Alkaline Phosphatase  47  


 


Troponin I High Sens    3.9


 


Total Protein  7.7  


 


Albumin  4.4  


 


Globulin  3.3  


 


Albumin/Globulin Ratio  1.3  


 


Lipase  26  














PD MEDICAL DECISION MAKING





- ED course


Complexity details: reviewed results, re-evaluated patient, considered 

differential, d/w patient


ED course: 





72 y/o female with abdominal pain received a dose of zofran prior to my 

evaluation and is asymptomatic now. 





Departure





- Departure


Disposition: 01 Home, Self Care


Clinical Impression: 


Abdominal pain


Qualifiers:


 Abdominal location: epigastric Qualified Code(s): R10.13 - Epigastric pain





Hypertension


Qualifiers:


 Hypertension type: essential hypertension Qualified Code(s): I10 - Essential 

(primary) hypertension





Condition: Stable


Instructions:  ED Abdominal Pain Unkn Cause, ED HTN Established


Follow-Up: 


SUKHWINDER MANDEL DO [Primary Care Provider] - 


Discharge Date/Time: 05/27/21 07:05

## 2021-05-27 NOTE — EXTERNAL MEDICAL SUMMARY RPT
Continuity of Care Document

                             Created on:May 27, 2021



Patient:Christie Mcarthur

Sex:Female

:1950

External Reference #:[id number]





Demographics







                          Phone                     Unavailable

 

                          Preferred Language        English

 

                          Marital Status            Unknown

 

                          Sabianism Affiliation     Unknown

 

                          Race                      Unknown

 

                          Ethnic Group              Unknown









Author







                          Organization              [facility]

 

                          Address                    [place] [id number]

 

                          Phone                     [phone]









Care Team Providers







                    Name                Role                Phone

 

                    Solomon               Unavailable         Unavailable









Allergies





Encounters





Medications





Problems







                     date                description         facility

 

                     2021            Encounter for screening mammogram for [facility]



                                        neoplasm of         







Results

## 2023-02-10 ENCOUNTER — HOSPITAL ENCOUNTER (OUTPATIENT)
Age: 73
End: 2023-02-10
Payer: MEDICARE

## 2023-02-10 DIAGNOSIS — Z12.31: Primary | ICD-10-CM

## 2023-02-10 PROCEDURE — 77067 SCR MAMMO BI INCL CAD: CPT

## 2023-02-10 PROCEDURE — 77063 BREAST TOMOSYNTHESIS BI: CPT

## 2023-05-18 ENCOUNTER — HOSPITAL ENCOUNTER (EMERGENCY)
Dept: HOSPITAL 76 - ED | Age: 73
Discharge: HOME | End: 2023-05-18
Payer: MEDICARE

## 2023-05-18 VITALS — DIASTOLIC BLOOD PRESSURE: 92 MMHG | SYSTOLIC BLOOD PRESSURE: 149 MMHG

## 2023-05-18 DIAGNOSIS — S92.512A: Primary | ICD-10-CM
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
## 2023-05-18 DIAGNOSIS — I10: ICD-10-CM

## 2023-05-18 PROCEDURE — 99283 EMERGENCY DEPT VISIT LOW MDM: CPT

## 2023-05-18 PROCEDURE — 99284 EMERGENCY DEPT VISIT MOD MDM: CPT

## 2023-05-18 NOTE — EXTERNAL MEDICAL SUMMARY RPT
Continuity of Care Document



                            Created on: May 18, 2023





Christie Mcarthur

External Reference #: [id number]

: 1950

Sex: Female



Demographics





                                        Address             [address] [place]  [address]

 

                                        Phone               Unavailable

 

                                        Preferred Language  English

 

                                        Marital Status      

 

                                        Anabaptism Affiliation Unknown

 

                                        Race                Unknown

 

                                        Ethnic Group        Unknown





Author





                                        Name                Unknown

 

                                        [place]  [address]

 

                                        Phone               [phone]

 

                                        Organization        [facility]

 

                                        Address             [address] [place] [address]  [address]

 

                                        Phone               [phone]







Problems





                                date            description     facility

 

                                        2023 08:44    Encounter for screen

ing mammogram for malignant 

neoplasm of                             [facility]

## 2023-05-18 NOTE — XRAY REPORT
PROCEDURE:  Foot 3 View LT

 

INDICATIONS:  STUBBED/PAIN/TENDERNESS L FOOT

 

TECHNIQUE:  3 views of the foot were acquired.  

 

COMPARISON:  None.

 

FINDINGS:  

 

Bones:  Oblique fracture, minimally displaced, shaft of proximal phalanx of fourth toe. No other frac
tures or dislocations.

 

Soft tissues:  No suspicious soft tissue calcifications or masses.  

 

 

IMPRESSION:  

Proximal phalanx fourth toe fracture.

 

 

 

Reviewed by: Nathaniel Mcclain MD on 5/18/2023 4:34 PM PDT

Approved by: Nathaniel Mcclain MD on 5/18/2023 4:34 PM PDT

 

 

Station ID:  [id number]

## 2023-05-18 NOTE — ED PHYSICIAN DOCUMENTATION
History of Present Illness





- Stated complaint


Stated Complaint: TOE INJURY





- Chief complaint


Chief Complaint: Ext Problem





- Additonal information


Additional information: 





73-year-old female presents emergency department for evaluation of acute left 

ring toe pain sustained when she stubbed her foot on the couch at home.  No 

history of previous injury.





Review of Systems


Musculoskeletal: reports: Extremity pain





PD PAST MEDICAL HISTORY





- Past Medical History


Cardiovascular: Hypertension, High cholesterol


Respiratory: Asthma, Pneumonia


HEENT: Glaucoma





- Past Surgical History


Past Surgical History: Yes


/GYN: Hysterectomy


HEENT: Tonsil/Adenoidectomy





- Present Medications


Home Medications: 


                                Ambulatory Orders











 Medication  Instructions  Recorded  Confirmed


 


Estradiol [Estrace] 0.5 mg PO DAILY 10/29/15 05/18/23


 


Atorvastatin [Lipitor] 10 mg PO DAILY 05/27/21 05/18/23


 


Metoprolol Succinate [Toprol Xl] 25 mg PO BID 05/27/21 05/18/23


 


HYDROcod/ACETAM 5/325 [Norco 5/325] 1 tablet PO BID PRN #10 tablet 05/18/23 














- Allergies


Allergies/Adverse Reactions: 


                                    Allergies











Allergy/AdvReac Type Severity Reaction Status Date / Time


 


No Known Drug Allergies Allergy   Verified 05/18/23 16:05














- Social History


Does the pt smoke?: No


Smoking Status: Never smoker


Does the pt drink ETOH?: Yes


Does the pt have substance abuse?: No





- Immunizations


Immunizations are current?: No


Immunizations: TDAP >10years/unknown





- POLST


Patient has POLST: No





PD ED PE NORMAL





- General


General: Alert and oriented X 3, No acute distress





- Extremities


Extremities: Other (Tenderness and ecchymosis of the left ring toe.  No obvious 

deformity.  Neurovascularly intact.  No pain with palpation of the foot L 

otherwise.  2+ DP pulse.)





Results





- Vitals


Vitals: 


                               Vital Signs - 24 hr











  05/18/23





  16:01


 


Temperature 36.8 C


 


Heart Rate 70


 


Respiratory 16





Rate 


 


Blood Pressure 149/92 H


 


O2 Saturation 97








                                     Oxygen











O2 Source                      Room air

















- Rads (name of study)


  ** left foot


Relevant Findings:: EMP independent interpretation of test (Toe proximal phalanx

fractureMildly displaced left fourth)





PD Medical Decision Making





- ED course


Complexity details: reviewed results


ED course: 





Very pleasant 73-year-old female presents emergency department for evaluation Of

a left fourth toe injury sustained when she stubbed her foot.  On exam she has 

tenderness of this digit with mild ecchymosis but no obvious deformity.  An x-

ray is interpreted by myself does show a mildly displaced fracture of the proxi

mal phalanx.  I do not identify any other fracture on x-ray imaging.  I did 

gemma tape this toe to the middle toe.  She was given a postop shoe.  I 

encouraged Tylenol and ibuprofen.  I also wrote her a limited prescription for 

Hydrocodone.  She is traveling to [place] tomorrow but is encouraged to follow 

with orthopedics upon return.  Usual emergent return precautions otherwise 

discussed





Departure





- Departure


Disposition: 01 Home, Self Care


Clinical Impression: 


 Fracture of proximal phalanx of toe of left foot





Condition: Stable


Record reviewed to determine appropriate education?: Yes


Instructions:  ED Fx Toe Closed


Prescriptions: 


HYDROcod/ACETAM 5/325 [Norco 5/325] 1 tablet PO BID PRN #10 tablet


 PRN Reason: Pain


Comments: 


Christie use stubbed your toe on the wooden edge of your couch.  You do have a 

mildly displaced left proximal phalanx fracture of the ring toe.





The general treatment for this is gemma taping the toe and wearing a hard sole 

postop shoe like we have given you.





In most cases there is no specific treatment for these types of fractures they 

simply heal.  I do recommend you ask your primary care provider for referral to 

orthopedics so that you can be seen when you return from [place].





Please try and enjoy your trip and I do recommend that you take Tylenol or 

ibuprofen for discomfort.  For more severe pain I have prescribed some 

hydrocodone to the [facility] in [place].








Discharge Date/Time: 05/18/23 16:43

## 2023-09-18 ENCOUNTER — HOSPITAL ENCOUNTER (OUTPATIENT)
Age: 73
End: 2023-09-18
Payer: MEDICARE

## 2023-09-18 DIAGNOSIS — M47.816: ICD-10-CM

## 2023-09-18 DIAGNOSIS — M51.37: ICD-10-CM

## 2023-09-18 DIAGNOSIS — M47.817: ICD-10-CM

## 2023-09-18 DIAGNOSIS — M51.26: ICD-10-CM

## 2023-09-18 DIAGNOSIS — Z98.890: ICD-10-CM

## 2023-09-18 DIAGNOSIS — M51.36: ICD-10-CM

## 2023-09-18 DIAGNOSIS — Z90.710: ICD-10-CM

## 2023-09-18 DIAGNOSIS — M43.16: Primary | ICD-10-CM

## 2023-09-18 PROCEDURE — 72110 X-RAY EXAM L-2 SPINE 4/>VWS: CPT

## 2023-09-18 PROCEDURE — 99214 OFFICE O/P EST MOD 30 MIN: CPT

## 2023-10-26 ENCOUNTER — HOSPITAL ENCOUNTER (OUTPATIENT)
Dept: HOSPITAL 73 - RAD | Age: 73
Discharge: HOME | End: 2023-10-26
Payer: MEDICARE

## 2023-10-26 VITALS
DIASTOLIC BLOOD PRESSURE: 83 MMHG | HEART RATE: 73 BPM | SYSTOLIC BLOOD PRESSURE: 168 MMHG | RESPIRATION RATE: 20 BRPM | OXYGEN SATURATION: 98 %

## 2023-10-26 VITALS
SYSTOLIC BLOOD PRESSURE: 153 MMHG | HEART RATE: 83 BPM | RESPIRATION RATE: 16 BRPM | OXYGEN SATURATION: 98 % | DIASTOLIC BLOOD PRESSURE: 83 MMHG

## 2023-10-26 VITALS
HEART RATE: 87 BPM | DIASTOLIC BLOOD PRESSURE: 80 MMHG | RESPIRATION RATE: 19 BRPM | SYSTOLIC BLOOD PRESSURE: 131 MMHG | OXYGEN SATURATION: 98 %

## 2023-10-26 VITALS
RESPIRATION RATE: 20 BRPM | HEART RATE: 74 BPM | SYSTOLIC BLOOD PRESSURE: 138 MMHG | DIASTOLIC BLOOD PRESSURE: 74 MMHG | OXYGEN SATURATION: 98 %

## 2023-10-26 VITALS
OXYGEN SATURATION: 98 % | DIASTOLIC BLOOD PRESSURE: 80 MMHG | HEART RATE: 78 BPM | RESPIRATION RATE: 20 BRPM | SYSTOLIC BLOOD PRESSURE: 150 MMHG

## 2023-10-26 VITALS
HEART RATE: 74 BPM | RESPIRATION RATE: 18 BRPM | DIASTOLIC BLOOD PRESSURE: 86 MMHG | OXYGEN SATURATION: 97 % | SYSTOLIC BLOOD PRESSURE: 146 MMHG

## 2023-10-26 VITALS
OXYGEN SATURATION: 99 % | SYSTOLIC BLOOD PRESSURE: 148 MMHG | HEART RATE: 69 BPM | RESPIRATION RATE: 20 BRPM | TEMPERATURE: 98 F | DIASTOLIC BLOOD PRESSURE: 80 MMHG

## 2023-10-26 VITALS
HEART RATE: 77 BPM | OXYGEN SATURATION: 98 % | RESPIRATION RATE: 19 BRPM | DIASTOLIC BLOOD PRESSURE: 82 MMHG | SYSTOLIC BLOOD PRESSURE: 155 MMHG

## 2023-10-26 DIAGNOSIS — M47.816: Primary | ICD-10-CM

## 2023-10-26 DIAGNOSIS — M47.817: ICD-10-CM

## 2023-10-26 PROCEDURE — 64493 INJ PARAVERT F JNT L/S 1 LEV: CPT

## 2023-10-26 PROCEDURE — 64494 INJ PARAVERT F JNT L/S 2 LEV: CPT

## 2023-10-26 PROCEDURE — 99152 MOD SED SAME PHYS/QHP 5/>YRS: CPT

## 2023-10-26 RX ADMIN — MIDAZOLAM HYDROCHLORIDE 2 MG: 1 INJECTION, SOLUTION INTRAMUSCULAR; INTRAVENOUS at 11:10

## 2023-10-26 RX ADMIN — BUPIVACAINE HYDROCHLORIDE 2 ML: 5 INJECTION, SOLUTION EPIDURAL; INTRACAUDAL; PERINEURAL at 11:13

## 2024-03-07 ENCOUNTER — HOSPITAL ENCOUNTER (OUTPATIENT)
Dept: HOSPITAL 73 - RAD | Age: 74
Discharge: HOME | End: 2024-03-07
Payer: MEDICARE

## 2024-03-07 VITALS
DIASTOLIC BLOOD PRESSURE: 83 MMHG | SYSTOLIC BLOOD PRESSURE: 164 MMHG | HEART RATE: 76 BPM | OXYGEN SATURATION: 100 % | RESPIRATION RATE: 17 BRPM

## 2024-03-07 VITALS
HEART RATE: 78 BPM | RESPIRATION RATE: 20 BRPM | OXYGEN SATURATION: 98 % | SYSTOLIC BLOOD PRESSURE: 161 MMHG | DIASTOLIC BLOOD PRESSURE: 78 MMHG

## 2024-03-07 VITALS
SYSTOLIC BLOOD PRESSURE: 136 MMHG | DIASTOLIC BLOOD PRESSURE: 79 MMHG | RESPIRATION RATE: 20 BRPM | OXYGEN SATURATION: 99 % | HEART RATE: 67 BPM

## 2024-03-07 VITALS
RESPIRATION RATE: 21 BRPM | OXYGEN SATURATION: 98 % | DIASTOLIC BLOOD PRESSURE: 76 MMHG | SYSTOLIC BLOOD PRESSURE: 134 MMHG | HEART RATE: 69 BPM

## 2024-03-07 VITALS
SYSTOLIC BLOOD PRESSURE: 164 MMHG | RESPIRATION RATE: 18 BRPM | OXYGEN SATURATION: 98 % | DIASTOLIC BLOOD PRESSURE: 83 MMHG | HEART RATE: 78 BPM

## 2024-03-07 VITALS
OXYGEN SATURATION: 98 % | SYSTOLIC BLOOD PRESSURE: 135 MMHG | RESPIRATION RATE: 22 BRPM | HEART RATE: 68 BPM | DIASTOLIC BLOOD PRESSURE: 72 MMHG

## 2024-03-07 VITALS
HEART RATE: 75 BPM | DIASTOLIC BLOOD PRESSURE: 77 MMHG | OXYGEN SATURATION: 98 % | RESPIRATION RATE: 18 BRPM | TEMPERATURE: 96.98 F | SYSTOLIC BLOOD PRESSURE: 161 MMHG

## 2024-03-07 VITALS
DIASTOLIC BLOOD PRESSURE: 82 MMHG | RESPIRATION RATE: 16 BRPM | SYSTOLIC BLOOD PRESSURE: 151 MMHG | OXYGEN SATURATION: 97 % | HEART RATE: 71 BPM

## 2024-03-07 DIAGNOSIS — M47.817: ICD-10-CM

## 2024-03-07 DIAGNOSIS — M47.816: Primary | ICD-10-CM

## 2024-03-07 PROCEDURE — 99152 MOD SED SAME PHYS/QHP 5/>YRS: CPT

## 2024-03-07 PROCEDURE — 64493 INJ PARAVERT F JNT L/S 1 LEV: CPT

## 2024-03-07 PROCEDURE — 64495 INJ PARAVERT F JNT L/S 3 LEV: CPT

## 2024-03-07 RX ADMIN — LIDOCAINE HYDROCHLORIDE 5 ML: 20 INJECTION, SOLUTION EPIDURAL; INFILTRATION; INTRACAUDAL; PERINEURAL at 11:28

## 2024-03-07 RX ADMIN — MIDAZOLAM HYDROCHLORIDE 2 MG: 1 INJECTION, SOLUTION INTRAMUSCULAR; INTRAVENOUS at 11:22

## 2024-03-07 NOTE — P.PCN_ITS
Date/Time/Diagnoses    
Date of procedure: 03/07/24    
Time of procedure: 11:42    
Pre-procedure diagnosis: Lumbar Facet Arthropathy    
Post-procedure diagnosis: same    
Procedure Notes    
Procedure:     
1. Right L4, L5 and S1 MB BLOCKS SA    
Indications:     
Edith is referred by Dr. Thomas for treatment of Right Axial LBP.    
Physician: Marvin Olea    
Total Fluoroscopy time (seconds): 8    
Total sedation minutes: 13    
Complications: none    
Procedure in detail & Post-procedure care:     
DESCRIPTION OF PROCEDURE    
Fluoroscopically guided, contrast-controlled right L4, L5 and S1 medial branch   
blocks with 0.5cc of 2% Lidocaine.       
    
Following review of allergy and review of potential side effects and   
complications, including, but not necessarily limited to, infection, allergic   
reaction, local tissue breakdown, nerve injury, paralysis, stroke and possible   
death, the patient indicated that the patient understood and agreed to proceed.   
An informed consent document was signed by the patient, witnessed by a nurse,   
and placed in the patient's chart.    
    
After review of previous anaesthesic history and IV conscious sedation the   
patient was deemed safe to proceed with today?s procedure with IV conscious   
sedation as ASA class II designation. Safety time-out was performed to confirm   
patient ID, procedure to be performed and site of procedure. IV sedation was   
accomplished with a combination of 2mg of Versed was administered by the RN   
after DO order, titrated to patient comfort during the course of the procedure   
while the patient remained responsive to all verbal commands    
    
In the prone position, following sterile prep and drape of the lumbar region,   
the right L4, L5 and S1 anatomical location of the medial branch of the dorsal   
ramus was identified fluoroscopically.  Subsequently an anesthetic skin wheal   
using 1% lidocaine solution was initiated at each of the anatomical spots.    
Subsequently then a 22-gauge 3.5-inch spinal needle was atraumatically   
introduced and advanced under fluoroscopic guidance at each of the corresponding  
sites at the right L4, L5 and S1 MB.  After negative aspiration, 0.2 cc of   
Isovue 200 was injected, confirming placement without vascular or intrathecal   
uptake.  Subsequently then 0.5 cc of 2% Lidocaine solution was injected at each   
of the corresponding sites at the right L4, L5 and S1 medial branch locations.   
The patient tolerated the procedure well without signs or symptoms of   
complications.    
    
The procedure tolerated the procedure well without signs or symptoms of   
complications prior to transfer to the recovery area continued monitoring   
without incident.      
    
Post-procedure, the patient was monitored initiating provocative activities to   
measure the amount of relief from block of the facetogenic pain.  The patient   
reported a VAS of 7 prior to the procedure and a post-procedure VAS of 1.      
    
It has been a pleasure to assist in the diagnostic and therapeutic care of your   
patient.    
    
POST OP INSTRUCTIONS    
The patient was provided with a Pain Log to complete over the next several hours  
and subsequent days prior to the patient's follow up with the ordering   
physician.  If the patient has  relief to the solution applied,   
then they may be a candidate for medial branch rhizotomy.  The patient is aware,  
was provided, once again, with a Pain Log and will follow up with the referring   
physician for review and clinical correlation.

## 2024-03-07 NOTE — DI.RAD.S_ITS
PROCEDURE:  PAIN L/SI FACET INJ/BLK 1STL 
  
INDICATIONS:  Arthropathy of lumbar facet joint 
  
COMPARISON:  [facility], , PAIN L/SI FACET INJ/BLK 1STL, 10/26/2023, 11:13. 
  
FINDINGS:   
Fluoroscopic spot filming was performed to verify placement of spinal needles at the L4  
through S1 level(s), as labeled on the films.  Appropriate location(s) of the needle  
tip(s) was confirmed by injection of iodinated contrast.   
  
IMPRESSION:  Fluoroscopic guidance utilized for a medial branch block at L4 through S1. 
  
  
Dictated by: Luis Miguel Lim M.D. on 3/07/2024 at 14:55      
Approved by: Luis Miguel Lim M.D. on 3/07/2024 at 14:56